# Patient Record
Sex: FEMALE | Race: BLACK OR AFRICAN AMERICAN | NOT HISPANIC OR LATINO | Employment: OTHER | URBAN - METROPOLITAN AREA
[De-identification: names, ages, dates, MRNs, and addresses within clinical notes are randomized per-mention and may not be internally consistent; named-entity substitution may affect disease eponyms.]

---

## 2018-05-24 ENCOUNTER — TELEPHONE (OUTPATIENT)
Dept: INTERNAL MEDICINE | Facility: CLINIC | Age: 68
End: 2018-05-24

## 2018-05-24 NOTE — TELEPHONE ENCOUNTER
Left message to informed pt that she is not taking any New patients at the moment and to call back to est care with another physician

## 2018-05-24 NOTE — TELEPHONE ENCOUNTER
----- Message from Dianne Cha sent at 5/24/2018  3:44 PM CDT -----  Contact: self/ [phone]  Patient has just moved here from [place] and would like to be a patient of Dr. Herman. Patient is being referred by long time patient Dianne Jasso and Kristan Jasso. Please consider.

## 2018-06-25 ENCOUNTER — OFFICE VISIT (OUTPATIENT)
Dept: INTERNAL MEDICINE | Facility: CLINIC | Age: 68
End: 2018-06-25
Payer: MEDICARE

## 2018-06-25 ENCOUNTER — LAB VISIT (OUTPATIENT)
Dept: LAB | Facility: HOSPITAL | Age: 68
End: 2018-06-25
Attending: INTERNAL MEDICINE
Payer: MEDICARE

## 2018-06-25 VITALS
BODY MASS INDEX: 28.11 KG/M2 | SYSTOLIC BLOOD PRESSURE: 125 MMHG | HEIGHT: 71 IN | WEIGHT: 200.81 LBS | DIASTOLIC BLOOD PRESSURE: 80 MMHG

## 2018-06-25 DIAGNOSIS — D25.1 INTRAMURAL LEIOMYOMA OF UTERUS: ICD-10-CM

## 2018-06-25 DIAGNOSIS — E78.2 MIXED HYPERLIPIDEMIA: ICD-10-CM

## 2018-06-25 DIAGNOSIS — N39.3 STRESS INCONTINENCE IN FEMALE: ICD-10-CM

## 2018-06-25 DIAGNOSIS — I10 ESSENTIAL HYPERTENSION: ICD-10-CM

## 2018-06-25 DIAGNOSIS — K57.30 DIVERTICULOSIS OF LARGE INTESTINE WITHOUT HEMORRHAGE: ICD-10-CM

## 2018-06-25 DIAGNOSIS — N18.30 CKD (CHRONIC KIDNEY DISEASE) STAGE 3, GFR 30-59 ML/MIN: ICD-10-CM

## 2018-06-25 DIAGNOSIS — E55.9 VITAMIN D DEFICIENCY DISEASE: ICD-10-CM

## 2018-06-25 DIAGNOSIS — Z11.59 ENCOUNTER FOR HEPATITIS C SCREENING TEST FOR LOW RISK PATIENT: ICD-10-CM

## 2018-06-25 DIAGNOSIS — M16.0 OSTEOARTHRITIS OF BOTH HIPS, UNSPECIFIED OSTEOARTHRITIS TYPE: ICD-10-CM

## 2018-06-25 DIAGNOSIS — I10 ESSENTIAL HYPERTENSION: Primary | ICD-10-CM

## 2018-06-25 DIAGNOSIS — J45.20 MILD INTERMITTENT ASTHMA WITHOUT COMPLICATION: ICD-10-CM

## 2018-06-25 DIAGNOSIS — M81.0 OSTEOPOROSIS WITHOUT CURRENT PATHOLOGICAL FRACTURE, UNSPECIFIED OSTEOPOROSIS TYPE: ICD-10-CM

## 2018-06-25 LAB
25(OH)D3+25(OH)D2 SERPL-MCNC: 16 NG/ML
ALBUMIN SERPL BCP-MCNC: 4 G/DL
ALP SERPL-CCNC: 90 U/L
ALT SERPL W/O P-5'-P-CCNC: 10 U/L
ANION GAP SERPL CALC-SCNC: 8 MMOL/L
AST SERPL-CCNC: 19 U/L
BACTERIA #/AREA URNS AUTO: ABNORMAL /HPF
BASOPHILS # BLD AUTO: 0.04 K/UL
BASOPHILS NFR BLD: 0.7 %
BILIRUB SERPL-MCNC: 0.7 MG/DL
BILIRUB UR QL STRIP: NEGATIVE
BUN SERPL-MCNC: 35 MG/DL
CALCIUM SERPL-MCNC: 10.4 MG/DL
CHLORIDE SERPL-SCNC: 108 MMOL/L
CHOLEST SERPL-MCNC: 161 MG/DL
CHOLEST/HDLC SERPL: 2.4 {RATIO}
CLARITY UR REFRACT.AUTO: ABNORMAL
CO2 SERPL-SCNC: 24 MMOL/L
COLOR UR AUTO: YELLOW
CREAT SERPL-MCNC: 1.6 MG/DL
DIFFERENTIAL METHOD: ABNORMAL
EOSINOPHIL # BLD AUTO: 0.5 K/UL
EOSINOPHIL NFR BLD: 7.7 %
ERYTHROCYTE [DISTWIDTH] IN BLOOD BY AUTOMATED COUNT: 13.8 %
EST. GFR  (AFRICAN AMERICAN): 38 ML/MIN/1.73 M^2
EST. GFR  (NON AFRICAN AMERICAN): 33 ML/MIN/1.73 M^2
GLUCOSE SERPL-MCNC: 93 MG/DL
GLUCOSE UR QL STRIP: NEGATIVE
HCT VFR BLD AUTO: 37.5 %
HDLC SERPL-MCNC: 68 MG/DL
HDLC SERPL: 42.2 %
HGB BLD-MCNC: 12 G/DL
HGB UR QL STRIP: NEGATIVE
HYALINE CASTS UR QL AUTO: 3 /LPF
KETONES UR QL STRIP: NEGATIVE
LDLC SERPL CALC-MCNC: 70.8 MG/DL
LEUKOCYTE ESTERASE UR QL STRIP: ABNORMAL
LYMPHOCYTES # BLD AUTO: 2.1 K/UL
LYMPHOCYTES NFR BLD: 35 %
MCH RBC QN AUTO: 30.5 PG
MCHC RBC AUTO-ENTMCNC: 32 G/DL
MCV RBC AUTO: 95 FL
MICROSCOPIC COMMENT: ABNORMAL
MONOCYTES # BLD AUTO: 0.7 K/UL
MONOCYTES NFR BLD: 11.1 %
NEUTROPHILS # BLD AUTO: 2.7 K/UL
NEUTROPHILS NFR BLD: 45.3 %
NITRITE UR QL STRIP: NEGATIVE
NONHDLC SERPL-MCNC: 93 MG/DL
PH UR STRIP: 5 [PH] (ref 5–8)
PLATELET # BLD AUTO: 253 K/UL
PMV BLD AUTO: 9.6 FL
POTASSIUM SERPL-SCNC: 3.4 MMOL/L
PROT SERPL-MCNC: 8.2 G/DL
PROT UR QL STRIP: NEGATIVE
RBC # BLD AUTO: 3.93 M/UL
RBC #/AREA URNS AUTO: 1 /HPF (ref 0–4)
SODIUM SERPL-SCNC: 140 MMOL/L
SP GR UR STRIP: 1.03 (ref 1–1.03)
SQUAMOUS #/AREA URNS AUTO: 2 /HPF
TRIGL SERPL-MCNC: 111 MG/DL
URN SPEC COLLECT METH UR: ABNORMAL
UROBILINOGEN UR STRIP-ACNC: NEGATIVE EU/DL
WBC # BLD AUTO: 5.85 K/UL
WBC #/AREA URNS AUTO: 3 /HPF (ref 0–5)

## 2018-06-25 PROCEDURE — 80053 COMPREHEN METABOLIC PANEL: CPT

## 2018-06-25 PROCEDURE — 85025 COMPLETE CBC W/AUTO DIFF WBC: CPT

## 2018-06-25 PROCEDURE — 99999 PR PBB SHADOW E&M-EST. PATIENT-LVL IV: CPT | Mod: PBBFAC,,, | Performed by: INTERNAL MEDICINE

## 2018-06-25 PROCEDURE — 81001 URINALYSIS AUTO W/SCOPE: CPT

## 2018-06-25 PROCEDURE — 99204 OFFICE O/P NEW MOD 45 MIN: CPT | Mod: S$PBB,,, | Performed by: INTERNAL MEDICINE

## 2018-06-25 PROCEDURE — 99214 OFFICE O/P EST MOD 30 MIN: CPT | Mod: PBBFAC | Performed by: INTERNAL MEDICINE

## 2018-06-25 PROCEDURE — 36415 COLL VENOUS BLD VENIPUNCTURE: CPT

## 2018-06-25 PROCEDURE — 87086 URINE CULTURE/COLONY COUNT: CPT

## 2018-06-25 PROCEDURE — 86803 HEPATITIS C AB TEST: CPT

## 2018-06-25 PROCEDURE — 82306 VITAMIN D 25 HYDROXY: CPT

## 2018-06-25 PROCEDURE — 80061 LIPID PANEL: CPT

## 2018-06-25 RX ORDER — VALSARTAN 320 MG/1
320 TABLET ORAL DAILY
Refills: 1 | COMMUNITY
Start: 2018-04-28 | End: 2018-06-25 | Stop reason: SDUPTHER

## 2018-06-25 RX ORDER — VALSARTAN 320 MG/1
320 TABLET ORAL DAILY
Qty: 30 TABLET | Refills: 12 | Status: SHIPPED | OUTPATIENT
Start: 2018-06-25 | End: 2019-07-17 | Stop reason: SDUPTHER

## 2018-06-25 RX ORDER — PRAVASTATIN SODIUM 20 MG/1
20 TABLET ORAL DAILY
Qty: 30 TABLET | Refills: 2 | Status: CANCELLED | OUTPATIENT
Start: 2018-06-25

## 2018-06-25 RX ORDER — ALENDRONATE SODIUM 70 MG/1
70 TABLET ORAL
Qty: 4 TABLET | Refills: 12 | Status: SHIPPED | OUTPATIENT
Start: 2018-06-25 | End: 2019-07-27 | Stop reason: SDUPTHER

## 2018-06-25 RX ORDER — CALCITRIOL 0.25 UG/1
0.25 CAPSULE ORAL EVERY OTHER DAY
Refills: 2 | COMMUNITY
Start: 2018-05-29 | End: 2018-06-26

## 2018-06-25 RX ORDER — ALENDRONATE SODIUM 70 MG/1
70 TABLET ORAL
Refills: 1 | COMMUNITY
Start: 2018-04-03 | End: 2018-06-25 | Stop reason: SDUPTHER

## 2018-06-25 RX ORDER — ALBUTEROL SULFATE 90 UG/1
2 AEROSOL, METERED RESPIRATORY (INHALATION) EVERY 8 HOURS
Qty: 1 INHALER | Refills: 12
Start: 2018-06-25 | End: 2020-03-13

## 2018-06-25 RX ORDER — AMLODIPINE BESYLATE 5 MG/1
5 TABLET ORAL 2 TIMES DAILY
Qty: 30 TABLET | Refills: 12 | Status: SHIPPED | OUTPATIENT
Start: 2018-06-25 | End: 2019-01-16

## 2018-06-25 RX ORDER — PRAVASTATIN SODIUM 20 MG/1
20 TABLET ORAL DAILY
Refills: 4 | COMMUNITY
Start: 2018-06-04 | End: 2018-10-27 | Stop reason: SDUPTHER

## 2018-06-25 RX ORDER — AMLODIPINE BESYLATE 5 MG/1
5 TABLET ORAL 2 TIMES DAILY
Refills: 1 | COMMUNITY
Start: 2018-04-28 | End: 2018-06-25 | Stop reason: SDUPTHER

## 2018-06-25 NOTE — PATIENT INSTRUCTIONS
Understanding Diverticulosis and Diverticulitis     Pouches or diverticula usually occur in the lower part of the colon called the sigmoid.     The colon (large intestine) is the last part of the digestive tract. It absorbs water from stool and changes it from a liquid to a solid. In certain cases, small pouches called diverticula can form in the colon wall. This condition is called diverticulosis. The pouches can become infected. If this happens, it becomes a more serious problem called diverticulitis. These problems can be painful. But they can be managed.  Managing your condition  Diet changes or medicines may be prescribed.   If you have diverticulosis  Recommendations include:  · Diet changes are often enough to control symptoms. The main changes are adding fiber (roughage) and drinking more water. Fiber absorbs water as it travels through your colon. This helps your stool stay soft and move smoothly. Water helps this process.  · If needed, you may be told to take over-the-counter stool softeners.  · To help relieve pain, antispasmodic medicines may be prescribed.  · Watch for changes in your bowel movements. Tell the healthcare provider if you notice any changes.  · Begin an exercise program. Ask your healthcare provider how to get started.  · Get plenty of rest and sleep.   If you have diverticulitis  Treatment depends on how bad your symptoms are.  · For mild symptoms. You may be put on a liquid diet for a short time. Antibiotics are usually prescribed. If these two steps relieve your symptoms, you may then be prescribed a high-fiber diet. If you still have symptoms, your healthcare provider will discuss more treatment choices with you.  · For severe symptoms. You may need to be admitted to the hospital. There, you can be given IV antibiotics and fluids. You will also be put on a low-fiber or liquid diet. Although not common, surgery is needed in some people with severe symptoms.  [place] to colon health      Diverticulitis occurs when the pouches become infected or inflamed.     Help keep your colon healthy with a diet that includes plenty of high-fiber fruits, vegetables, and whole grains. Drink plenty of liquids like water and juice. Maintain a healthy lifestyle including regular exercise, stress management, and adequate rest and sleep.   Date Last Reviewed: 7/1/2016  © 8772-9260 [employer]. [address] [place] [address], [place] [address]. All rights reserved. This information is not intended as a substitute for professional medical care. Always follow your healthcare professional's instructions.        Prevention Guidelines, Women Ages 65 and Older  Screening tests and vaccines are an important part of managing your health. Health counseling is essential, too. Below are guidelines for these, for women ages 65 and older. Talk with your healthcare provider to make sure youre up to date on what you need.  Screening Who needs it How often   Type 2 diabetes or prediabetes All adults beginning at age 45 and adults without symptoms at any age who are overweight or obese and have 1 or more additional risk factors for diabetes At least every 3 years   Alcohol misuse All women in this age group At routine exams   Blood pressure All women in this age group Every 2 years if your blood pressure is less than 120/80 mm Hg; yearly if your systolic blood pressure is 120 to 139 mm Hg, or your diastolic blood pressure reading is 80 to 89 mm Hg   Breast cancer All women in this age group Yearly mammogram and clinical breast exam1   Cervical cancer Only women who had abnormal screening results before age 65 Talk with your healthcare provider   Chlamydia Women at increased risk for infection At routine exams   Colorectal cancer All women in this age group1 Flexible sigmoidoscopy every 5 years, or colonoscopy every 10 years, or double-contrast barium enema every 5 years; yearly fecal occult blood test or fecal immunochemical  test; or a stool DNA test as often as your healthcare provider advises; talk with your healthcare provider about which tests are best for you   Depression All women in this age group At routine exams   Gonorrhea Sexually active women at increased risk for infection At routine exams   Hepatitis C Anyone at increased risk; 1 time for those born between 1945 and 1965 At routine exams   High cholesterol or triglycerides All women in this age group who are at risk for coronary artery disease At least every 5 years   HIV Women at increased risk for infection - talk with your healthcare provider At routine exams   Lung cancer Adults age 55 to 80 who have smoked Yearly screening in smokers with 30 pack-year history of smoking or who quit within 15 years   Obesity All women in this age group At routine exams   Osteoporosis All women in this age group Bone density test at age 65, then follow-up as advised by your healthcare provider   Syphilis Women at increased risk for infection - talk with your healthcare provider At routine exams   Thyroid-Stimulating Hormone (TSH) All women in this age group Every 5 years   Tuberculosis Women at increased risk for infection - talk with your healthcare provider Ask your healthcare provider   Vision All women in this age group Every 1 to 2 years; if you have a chronic health condition, ask your healthcare provider if you need exams more often   Vaccine Who needs it How often   Chickenpox (varicella) All women in this age group who have no record of this infection or vaccine 2 doses; second dose should be given at least 4 weeks after the first dose   Hepatitis A Women at increased risk for infection - talk with your healthcare provider 2 doses given 6 months apart   Hepatitis B Women at increased risk for infection - talk with your healthcare provider 3 doses over 6 months; second dose should be given 1 month after the first dose; the third dose should be given at least 2 months after the  second dose and at least 4 months after the first dose   Haemophilus influenza Type B (HIB) Women at increased risk for infection - talk with your healthcare provider 1 to 3 doses   Influenza (flu) All women in this age group Once a year   Pneumococcal conjugate vaccine (PCV13) and pneumococcal polysaccharide vaccine (PPSV23) All women in this age group 1 dose of each vaccine   Tetanus/diphtheria/pertussis (Td/Tdap) booster All women in this age group Td every 10 years, or a one-time dose of Tdap instead of a Td booster after age 18, then Td every 10 years   Zoster All women in this age group 1 dose   Counseling Who needs it How often   Diet and exercise Women who are overweight or obese When diagnosed, and then at routine exams   Fall prevention (exercise and vitamin D supplements) All women in this age group At routine exams   Sexually transmitted infection prevention Women at increased risk for infection - talk with your healthcare provider At routine exams   Use of daily aspirin Women ages 55 and up in this age group who are at risk for cardiovascular health problems such as stroke When your risk is known   Use of tobacco and the health effects it can cause All women in this age group Every exam   1American [employer] Society  Date Last Reviewed: 8/9/2015  © 1224-4495 [employer]. [address] [place] [address], [place] [address]. All rights reserved. This information is not intended as a substitute for professional medical care. Always follow your healthcare professional's instructions.

## 2018-06-25 NOTE — PROGRESS NOTES
Subjective:       Patient ID: Moriah Cha is a 68 y.o. female.    Chief Complaint: Hypertension; Hyperlipidemia; and Osteoporosis    Multiple issues    HTN, lipids, osteoporosis    Sees Dr Derek Buck for GYN- he will take care of her mammograms.  Last mammogram was done in [place] in November of 2017.    Skin tags    Orthopedic issues, hip pain, walks with a cane.  DJD both hips seen on CT scans in [place].  Hurts to sit and walk, at least 6 months.  Quite severe, quite limited.    Diverticulosis without diverticulitis on CT 4/17.    No symptoms, she was not aware.    Calcified uterine fibroid on CT 4/17.    Hypertension and hyperlipidemia times many years.  CKD.  Has seen Nephrology in the past.  Trivial smoking history < 12 pack years.      Patient Active Problem List:     Essential hypertension     Osteoporosis without current pathological fracture     Mixed hyperlipidemia     Osteoarthritis of both hips     CKD (chronic kidney disease) stage 3, GFR 30-59 ml/min     Diverticulosis of large intestine without hemorrhage: seen on CT scan April 2017     Intramural leiomyoma of uterus: see CT scan April 2017     Vitamin D deficiency disease     Mild intermittent asthma without complication     Stress incontinence in female            Review of Systems   Constitutional: Negative for activity change, appetite change, chills, fatigue and fever.   HENT: Negative for congestion, hearing loss, sinus pressure and sore throat.    Eyes: Negative for visual disturbance.   Respiratory: Negative for apnea, cough, shortness of breath and wheezing.    Cardiovascular: Negative for chest pain, palpitations and leg swelling.   Gastrointestinal: Negative for abdominal distention, abdominal pain, constipation, diarrhea, nausea and vomiting.   Genitourinary: Negative for dysuria, frequency, hematuria and vaginal bleeding.        TOMAS   Musculoskeletal: Positive for arthralgias. Negative for gait problem, joint swelling and myalgias.         Walks with cane   Skin: Negative for rash.        Skin tag L arm  Cyst R leg   Neurological: Negative for dizziness, weakness, light-headedness and headaches.   Hematological: Negative for adenopathy. Does not bruise/bleed easily.   Psychiatric/Behavioral: Negative for confusion, hallucinations, sleep disturbance and suicidal ideas.       Objective:      Physical Exam   Constitutional: She is oriented to person, place, and time. She appears well-developed and well-nourished.   HENT:   Head: Normocephalic and atraumatic.   Right Ear: External ear normal.   Left Ear: External ear normal.   Nose: Nose normal.   Mouth/Throat: Oropharynx is clear and moist. No oropharyngeal exudate.   Eyes: Conjunctivae and EOM are normal. No scleral icterus.   Neck: Normal range of motion. Neck supple. No JVD present. No thyromegaly present.   Cardiovascular: Normal rate, regular rhythm, normal heart sounds and intact distal pulses.  Exam reveals no gallop.    No murmur heard.  Pulmonary/Chest: Effort normal and breath sounds normal. No respiratory distress. She has no wheezes.   Abdominal: Soft. Bowel sounds are normal. She exhibits no distension and no mass. There is no tenderness. There is no rebound and no guarding.   Musculoskeletal: Normal range of motion. She exhibits no edema or tenderness.   Walks with cane otherwise unassisted   Lymphadenopathy:     She has no cervical adenopathy.   Neurological: She is alert and oriented to person, place, and time. She displays normal reflexes. No cranial nerve deficit. Coordination normal.   Skin: Skin is warm. No rash noted. No erythema.   Skin tag L arm  Cyst subcutaneous R thigh   Psychiatric: She has a normal mood and affect. Her behavior is normal. Judgment and thought content normal.   Nursing note and vitals reviewed.      Assessment:       1. Essential hypertension    2. Osteoporosis without current pathological fracture, unspecified osteoporosis type    3. Mixed hyperlipidemia     4. Osteoarthritis of both hips, unspecified osteoarthritis type    5. CKD (chronic kidney disease) stage 3, GFR 30-59 ml/min    6. Diverticulosis of large intestine without hemorrhage: seen on CT scan April 2017    7. Intramural leiomyoma of uterus: see CT scan April 2017    8. Vitamin D deficiency disease    9. Encounter for hepatitis C screening test for low risk patient    10. Mild intermittent asthma without complication    11. Stress incontinence in female        Plan:         Essential hypertension  -     Comprehensive metabolic panel; Future; Expected date: 06/25/2018  -     CBC auto differential; Future; Expected date: 06/25/2018    Osteoporosis without current pathological fracture, unspecified osteoporosis type:  She thinks she has had a bone density in the last year and would like to defer on a DEXA scan until she can track down that report.  She will continue her current regimen until such time as that can be completed.    Mixed hyperlipidemia  -     Lipid panel; Future; Expected date: 06/25/2018    Osteoarthritis of both hips, unspecified osteoarthritis type  -     Ambulatory referral to Orthopedics    CKD (chronic kidney disease) stage 3, GFR 30-59 ml/min:  Gentle hydration, avoid nephrotoxins.  Labs and review    Diverticulosis of large intestine without hemorrhage: seen on CT scan April 2017:  No symptoms.  Handouts given.    Intramural leiomyoma of uterus: see CT scan April 2017:  No symptoms, keep gyn follow-up    Vitamin D deficiency disease  -     Vitamin D; Future; Expected date: 06/25/2018    Encounter for hepatitis C screening test for low risk patient  -     Hepatitis C antibody; Future; Expected date: 06/25/2018    Mild intermittent asthma without complication:  Uses albuterol very infrequently    Stress incontinence in female  -     Urinalysis  -     Urine culture    Other orders  -     alendronate (FOSAMAX) 70 MG tablet; Take 1 tablet (70 mg total) by mouth every 7 days.  Dispense: 4  tablet; Refill: 12  -     amLODIPine (NORVASC) 5 MG tablet; Take 1 tablet (5 mg total) by mouth 2 (two) times daily.  Dispense: 30 tablet; Refill: 12  -     valsartan (DIOVAN) 320 MG tablet; Take 1 tablet (320 mg total) by mouth once daily.  Dispense: 30 tablet; Refill: 12  -     albuterol 90 mcg/actuation inhaler; Inhale 2 puffs into the lungs every 8 (eight) hours.  Dispense: 1 Inhaler; Refill: 12    Exercise as tolerated, she is very limited, Orthopedics recommended  Tetanus booster recommended, she is going to research whether she may have had this done  Shingles vaccine recommended, not available currently    I will review all studies and determine further tx depending on findings

## 2018-06-26 ENCOUNTER — TELEPHONE (OUTPATIENT)
Dept: INTERNAL MEDICINE | Facility: CLINIC | Age: 68
End: 2018-06-26

## 2018-06-26 DIAGNOSIS — N18.30 CKD (CHRONIC KIDNEY DISEASE) STAGE 3, GFR 30-59 ML/MIN: ICD-10-CM

## 2018-06-26 DIAGNOSIS — E55.9 VITAMIN D DEFICIENCY DISEASE: Primary | ICD-10-CM

## 2018-06-26 LAB
BACTERIA UR CULT: NORMAL
BACTERIA UR CULT: NORMAL
HCV AB SERPL QL IA: NEGATIVE

## 2018-06-26 RX ORDER — ERGOCALCIFEROL 1.25 MG/1
50000 CAPSULE ORAL
Qty: 12 CAPSULE | Refills: 3 | Status: SHIPPED | OUTPATIENT
Start: 2018-06-26 | End: 2019-01-16

## 2018-06-26 RX ORDER — VIT C/E/ZN/COPPR/LUTEIN/ZEAXAN 250MG-90MG
2000 CAPSULE ORAL DAILY
Qty: 60 CAPSULE | Refills: 12 | Status: SHIPPED | OUTPATIENT
Start: 2018-06-26 | End: 2019-01-16

## 2018-06-26 NOTE — TELEPHONE ENCOUNTER
----- Message from Charmaine Zavaleta sent at 6/26/2018  3:27 PM CDT -----  Contact: Santo/JADE/[phone]  Pharmacy is calling to clarify an RX.  RX name:cholecalciferol, vitamin D3, 1,000 unit capsule    What do they need to clarify:  If patient is suppose to be on 3 different vitamin d  Comments: thank you!!!

## 2018-06-26 NOTE — TELEPHONE ENCOUNTER
Patient has low vitamin D.  I sent Vitamin D 61196 IU 1 x weekly for 20 weeks.  Also should also take vitamin D 2000 IU daily, recheck in 4 months (orders in) thanks    Other labs are stable.  Given her chronic kidney disease, I would recommend an appointment with Nephrology, although it is not urgent.  A referral is in place, please assist with appointment.  Thank you

## 2018-06-27 NOTE — TELEPHONE ENCOUNTER
I recommend she stop the calcitriol because I do not think it is effective.    I sent Vitamin D 81970 IU 1 x weekly for 20 weeks.  Also should also take vitamin D 2000 IU daily, recheck in 4 months (orders in) thanks     Other labs are stable.  Given her chronic kidney disease, I would recommend an appointment with Nephrology, although it is not urgent.  A referral is in place, please assist with appointment.  Thank you

## 2018-06-27 NOTE — TELEPHONE ENCOUNTER
Spoke with [facility]ist as [facility], pt is currently on vit D 50,000 units and additional 2000 units prescribed by dr Herman. Per pharmacist, pt has medication on file Calcitrol 0.25 mcg one capsule every other day prescribed by another MD in May of this year.. Pharmacist is concerned that she's on too much vit D medication

## 2018-06-27 NOTE — TELEPHONE ENCOUNTER
"Spoke with pt, notified of the medication change, pt verbalized understanding. Lab appt made. Pt doesn't know her ride schedule, she would like to schedule Nephrology appt herself, phone number is provided. Pt is wondering, since she has stopped Oxybutynin ( it didn't help her all), what else can she take in its place? Also, pt is having hip pain, besides Tylenol, is there any "rub" or spray that she can use to for pain?  "

## 2018-06-28 NOTE — TELEPHONE ENCOUNTER
Given her kidney issues, I would recommend she discuss her urinary issues with the nephrologist before we start any new medication.  Similarly, for her hip, she needs to see Orthopedics.  Urine acceptable

## 2018-06-29 ENCOUNTER — TELEPHONE (OUTPATIENT)
Dept: INTERNAL MEDICINE | Facility: CLINIC | Age: 68
End: 2018-06-29

## 2018-06-29 NOTE — TELEPHONE ENCOUNTER
----- Message from Charmainedarrin Zavaleta sent at 6/29/2018  3:28 PM CDT -----  Contact: self/[phone]  Patient called in regards needing to talk with Dr Herman about find out if there is a specific nephrologist and orthopaedic that patient can be refer to. Also would like to know about any medicine she can be offer for frequent urination, and if there is a topica for hip pain.    Please call and advise. Thank you!!!

## 2018-06-29 NOTE — TELEPHONE ENCOUNTER
No specific ortho or nephrologist    Given her kidney issues, I would recommend she discuss her urinary issues with the nephrologist before we start any new medication.      In terms of the hip, it is so severe that I am not optimistic that any topical treatment will be beneficial.    Urine results are acceptable.    Referrals are in for both Orthopedics and Nephrology.

## 2018-07-17 ENCOUNTER — TELEPHONE (OUTPATIENT)
Dept: INTERNAL MEDICINE | Facility: CLINIC | Age: 68
End: 2018-07-17

## 2018-07-17 NOTE — TELEPHONE ENCOUNTER
----- Message from Mita Jose sent at 7/16/2018  4:36 PM CDT -----  Contact: [phone]  Patient has questions for the doctor:  Can the code for patient my [facility] portal be reactivated if patient doesn't use it by the end of the year?    On the list of vaccines for patient which ones are neccessary for her to take?    Should she no longer take pravastatin (PRAVACHOL) 20 MG tablet,  vit B complx C/folic acid/zinc (DIALYVITE 800 WITH ZINC 15 ORAL), and calcitriol?    Did Dr. Dominguez send over medical records?    Please call and advise.  Thank you

## 2018-07-30 ENCOUNTER — TELEPHONE (OUTPATIENT)
Dept: INTERNAL MEDICINE | Facility: CLINIC | Age: 68
End: 2018-07-30

## 2018-07-30 NOTE — TELEPHONE ENCOUNTER
----- Message from Kendal Mo sent at 7/30/2018  2:24 PM CDT -----  Contact: Patient [phone]  She want you to refax her records request form to [facility] and the fax number [phone].  Please call and advise her when it it done. The 's name is Tien Dominguez.    Thank you

## 2018-09-21 RX ORDER — CALCITRIOL 0.25 UG/1
0.25 CAPSULE ORAL DAILY
OUTPATIENT
Start: 2018-09-21

## 2018-09-21 NOTE — TELEPHONE ENCOUNTER
----- Message from Aniya Herring sent at 9/21/2018  2:46 PM CDT -----  Contact: [facility] [phone]  Prescription Request:     Name of medication: calcitRIOL (ROCALTROL) 0.25 MCG Cap     Reason for request: Refill    Pharmacy: [facility]/pharmacy #[id number] - [address]    Please advise.    Thank You

## 2018-09-24 ENCOUNTER — TELEPHONE (OUTPATIENT)
Dept: INTERNAL MEDICINE | Facility: CLINIC | Age: 68
End: 2018-09-24

## 2018-09-24 NOTE — TELEPHONE ENCOUNTER
----- Message from Aniya Herring sent at 9/24/2018 12:46 PM CDT -----  Contact: [facility] [phone]  Prescription Request:     Name of medication: calcitRIOL (ROCALTROL) 0.25 MCG Cap     Reason for request: Refill    Pharmacy: [facility]/pharmacy #[id number] - [address]    Please advise.    Thank You

## 2018-10-17 ENCOUNTER — TELEPHONE (OUTPATIENT)
Dept: INTERNAL MEDICINE | Facility: CLINIC | Age: 68
End: 2018-10-17

## 2018-10-17 NOTE — TELEPHONE ENCOUNTER
Spoke to pt and went over her labs coming up and also pt was notified that she can have her shots at the pharmacy   Pt voiced understanding

## 2018-10-17 NOTE — TELEPHONE ENCOUNTER
----- Message from Willow Cha sent at 10/17/2018 12:40 PM CDT -----  Contact: Patient [phone]  Pt wants to know if she would be able to get her tetanus shot with  and she wants to know if Dr. Herman will schedule an appt after her labs. Please call back and advise.      Thanks

## 2018-10-22 ENCOUNTER — IMMUNIZATION (OUTPATIENT)
Dept: INTERNAL MEDICINE | Facility: CLINIC | Age: 68
End: 2018-10-22
Payer: MEDICARE

## 2018-10-22 ENCOUNTER — TELEPHONE (OUTPATIENT)
Dept: INTERNAL MEDICINE | Facility: CLINIC | Age: 68
End: 2018-10-22

## 2018-10-22 ENCOUNTER — LAB VISIT (OUTPATIENT)
Dept: LAB | Facility: HOSPITAL | Age: 68
End: 2018-10-22
Attending: INTERNAL MEDICINE
Payer: MEDICARE

## 2018-10-22 DIAGNOSIS — N18.30 CKD (CHRONIC KIDNEY DISEASE) STAGE 3, GFR 30-59 ML/MIN: ICD-10-CM

## 2018-10-22 DIAGNOSIS — E55.9 VITAMIN D DEFICIENCY DISEASE: ICD-10-CM

## 2018-10-22 LAB
25(OH)D3+25(OH)D2 SERPL-MCNC: 33 NG/ML
ALBUMIN SERPL BCP-MCNC: 4 G/DL
ANION GAP SERPL CALC-SCNC: 11 MMOL/L
BUN SERPL-MCNC: 30 MG/DL
CALCIUM SERPL-MCNC: 10.6 MG/DL
CHLORIDE SERPL-SCNC: 103 MMOL/L
CO2 SERPL-SCNC: 24 MMOL/L
CREAT SERPL-MCNC: 1.5 MG/DL
EST. GFR  (AFRICAN AMERICAN): 41 ML/MIN/1.73 M^2
EST. GFR  (NON AFRICAN AMERICAN): 35.5 ML/MIN/1.73 M^2
GLUCOSE SERPL-MCNC: 90 MG/DL
PHOSPHATE SERPL-MCNC: 3.5 MG/DL
POTASSIUM SERPL-SCNC: 3.9 MMOL/L
SODIUM SERPL-SCNC: 138 MMOL/L

## 2018-10-22 PROCEDURE — 80069 RENAL FUNCTION PANEL: CPT

## 2018-10-22 PROCEDURE — 82306 VITAMIN D 25 HYDROXY: CPT

## 2018-10-22 PROCEDURE — 36415 COLL VENOUS BLD VENIPUNCTURE: CPT

## 2018-10-22 PROCEDURE — 90662 IIV NO PRSV INCREASED AG IM: CPT | Mod: PBBFAC

## 2018-10-22 NOTE — TELEPHONE ENCOUNTER
----- Message from Leann Sahni sent at 10/22/2018 11:03 AM CDT -----  Contact: pt  Pt would like to be called back regarding the flu shot also Pneumonia     Pt can be reached at [phone]

## 2018-10-25 ENCOUNTER — TELEPHONE (OUTPATIENT)
Dept: INTERNAL MEDICINE | Facility: CLINIC | Age: 68
End: 2018-10-25

## 2018-10-25 DIAGNOSIS — M81.0 OSTEOPOROSIS WITHOUT CURRENT PATHOLOGICAL FRACTURE, UNSPECIFIED OSTEOPOROSIS TYPE: ICD-10-CM

## 2018-10-25 DIAGNOSIS — N18.30 CKD (CHRONIC KIDNEY DISEASE) STAGE 3, GFR 30-59 ML/MIN: Primary | ICD-10-CM

## 2018-10-25 NOTE — TELEPHONE ENCOUNTER
Please let her know that her labs show elevated calcium that is slight, likely related to her kidney function.  I recommend that she not take any calcium over-the-counter currently.    I also recommend that she get established with a nephrologist.  Please assist with appointment, referral is in.  Please let me know when scheduled.    She is also due for a bone density exam which I have ordered.  Repeat lab work in 3 months and see me at that time, orders in.    Thank you

## 2018-10-28 RX ORDER — PRAVASTATIN SODIUM 20 MG/1
TABLET ORAL
Qty: 30 TABLET | Refills: 1 | Status: SHIPPED | OUTPATIENT
Start: 2018-10-28 | End: 2019-01-01 | Stop reason: SDUPTHER

## 2018-10-30 NOTE — TELEPHONE ENCOUNTER
----- Message from Willow Semaj sent at 10/30/2018 11:59 AM CDT -----  Contact: Patient [phone]  Type: Returning a call    Who left a message?Liliana Peraza LPN    When did the practice call?Friday 10/26/18    Comments:Please call back.      Thanks

## 2018-11-09 NOTE — TELEPHONE ENCOUNTER
Has been very difficult to reach, has not responded to numerous phone calls.  She is not on my [facility].  Letter sent today.

## 2018-11-16 ENCOUNTER — TELEPHONE (OUTPATIENT)
Dept: INTERNAL MEDICINE | Facility: CLINIC | Age: 68
End: 2018-11-16

## 2018-11-16 NOTE — TELEPHONE ENCOUNTER
----- Message from Kendal Mo sent at 11/16/2018  2:39 PM CST -----  Contact: Patient [phone]  The patient requested a call back to discuss the Nephrology referral.    Thank you

## 2018-11-16 NOTE — TELEPHONE ENCOUNTER
Spoke with pt and she wants to let you know nephrology dont have any appts until January 2019 . What should she do until then. Pt also wants to know which nephrology md you recommend. Pt aware you out the office until monday

## 2018-11-20 ENCOUNTER — HOSPITAL ENCOUNTER (OUTPATIENT)
Dept: RADIOLOGY | Facility: OTHER | Age: 68
Discharge: HOME | End: 2018-11-20
Attending: INTERNAL MEDICINE
Payer: MEDICARE

## 2018-11-20 ENCOUNTER — TELEPHONE (OUTPATIENT)
Dept: INTERNAL MEDICINE | Facility: CLINIC | Age: 68
End: 2018-11-20

## 2018-11-20 DIAGNOSIS — M81.0 OSTEOPOROSIS WITHOUT CURRENT PATHOLOGICAL FRACTURE, UNSPECIFIED OSTEOPOROSIS TYPE: ICD-10-CM

## 2018-11-20 PROCEDURE — 77080 DXA BONE DENSITY AXIAL: CPT | Mod: 26,,, | Performed by: RADIOLOGY

## 2018-11-20 PROCEDURE — 77080 DXA BONE DENSITY AXIAL: CPT | Mod: TC

## 2018-11-20 NOTE — TELEPHONE ENCOUNTER
Please call to let her know that her bone density does show osteoporosis. Please ask if she is taking her Fosamax as directed?  It is once weekly.

## 2018-11-20 NOTE — TELEPHONE ENCOUNTER
Spoke with pt, notified. Pt verbalized understanding. Pt is taking Fosamax weekly as prescribed for almost a year now.

## 2018-11-21 ENCOUNTER — TELEPHONE (OUTPATIENT)
Dept: INTERNAL MEDICINE | Facility: CLINIC | Age: 68
End: 2018-11-21

## 2018-11-21 NOTE — TELEPHONE ENCOUNTER
Okay, please encourage her to continue taking the medication.  We may need to do another bone density in 1 year instead of 2.  Keep appointment to see me in January.  Thank you

## 2018-11-21 NOTE — TELEPHONE ENCOUNTER
----- Message from Kendal Mo sent at 11/21/2018  4:15 PM CST -----  Contact: Patient [phone]  Patient is returning a phone call.  Who left a message for the patient: Liliana  Does patient know what this is regarding:  Yes. I gave her the message from Dr. Herman in the notes. She said ok

## 2018-12-18 ENCOUNTER — TELEPHONE (OUTPATIENT)
Dept: INTERNAL MEDICINE | Facility: CLINIC | Age: 68
End: 2018-12-18

## 2018-12-18 NOTE — TELEPHONE ENCOUNTER
----- Message from Suzanne Horner sent at 12/18/2018  4:04 PM CST -----  Contact: [phone]  Patient is requesting office to mail her last blood test to her, patient would like a copy.    Please advise,thanks

## 2019-01-02 RX ORDER — PRAVASTATIN SODIUM 20 MG/1
TABLET ORAL
Qty: 30 TABLET | Refills: 1 | Status: SHIPPED | OUTPATIENT
Start: 2019-01-02 | End: 2019-03-09 | Stop reason: SDUPTHER

## 2019-01-09 ENCOUNTER — LAB VISIT (OUTPATIENT)
Dept: LAB | Facility: OTHER | Age: 69
End: 2019-01-09
Payer: MEDICARE

## 2019-01-09 DIAGNOSIS — N18.30 CKD (CHRONIC KIDNEY DISEASE) STAGE 3, GFR 30-59 ML/MIN: ICD-10-CM

## 2019-01-09 LAB
ALBUMIN SERPL BCP-MCNC: 4.1 G/DL
ANION GAP SERPL CALC-SCNC: 11 MMOL/L
BUN SERPL-MCNC: 28 MG/DL
CALCIUM SERPL-MCNC: 10.2 MG/DL
CHLORIDE SERPL-SCNC: 104 MMOL/L
CO2 SERPL-SCNC: 23 MMOL/L
CREAT SERPL-MCNC: 1.4 MG/DL
EST. GFR  (AFRICAN AMERICAN): 44 ML/MIN/1.73 M^2
EST. GFR  (NON AFRICAN AMERICAN): 38 ML/MIN/1.73 M^2
GLUCOSE SERPL-MCNC: 80 MG/DL
PHOSPHATE SERPL-MCNC: 3.3 MG/DL
POTASSIUM SERPL-SCNC: 3.6 MMOL/L
SODIUM SERPL-SCNC: 138 MMOL/L

## 2019-01-09 PROCEDURE — 80069 RENAL FUNCTION PANEL: CPT

## 2019-01-16 ENCOUNTER — TELEPHONE (OUTPATIENT)
Dept: INTERNAL MEDICINE | Facility: CLINIC | Age: 69
End: 2019-01-16

## 2019-01-16 ENCOUNTER — CLINICAL SUPPORT (OUTPATIENT)
Dept: INTERNAL MEDICINE | Facility: CLINIC | Age: 69
End: 2019-01-16
Payer: MEDICARE

## 2019-01-16 ENCOUNTER — OFFICE VISIT (OUTPATIENT)
Dept: INTERNAL MEDICINE | Facility: CLINIC | Age: 69
End: 2019-01-16
Payer: MEDICARE

## 2019-01-16 VITALS — DIASTOLIC BLOOD PRESSURE: 75 MMHG | SYSTOLIC BLOOD PRESSURE: 120 MMHG

## 2019-01-16 DIAGNOSIS — E78.2 MIXED HYPERLIPIDEMIA: ICD-10-CM

## 2019-01-16 DIAGNOSIS — N18.30 CKD (CHRONIC KIDNEY DISEASE) STAGE 3, GFR 30-59 ML/MIN: ICD-10-CM

## 2019-01-16 DIAGNOSIS — N25.81 SECONDARY HYPERPARATHYROIDISM OF RENAL ORIGIN: ICD-10-CM

## 2019-01-16 DIAGNOSIS — M81.8 OTHER OSTEOPOROSIS WITHOUT CURRENT PATHOLOGICAL FRACTURE: ICD-10-CM

## 2019-01-16 DIAGNOSIS — I10 ESSENTIAL HYPERTENSION: Primary | ICD-10-CM

## 2019-01-16 DIAGNOSIS — D21.9 FIBROMA: ICD-10-CM

## 2019-01-16 DIAGNOSIS — E55.9 VITAMIN D DEFICIENCY DISEASE: ICD-10-CM

## 2019-01-16 DIAGNOSIS — Z23 IMMUNIZATION, PNEUMOCOCCUS AND INFLUENZA: Primary | ICD-10-CM

## 2019-01-16 PROCEDURE — 90471 IMMUNIZATION ADMIN: CPT | Mod: PBBFAC

## 2019-01-16 PROCEDURE — 99214 OFFICE O/P EST MOD 30 MIN: CPT | Mod: S$PBB,,, | Performed by: INTERNAL MEDICINE

## 2019-01-16 PROCEDURE — 99999 PR PBB SHADOW E&M-EST. PATIENT-LVL III: CPT | Mod: PBBFAC,,, | Performed by: INTERNAL MEDICINE

## 2019-01-16 PROCEDURE — 90715 TDAP VACCINE 7 YRS/> IM: CPT | Mod: PBBFAC

## 2019-01-16 PROCEDURE — 90472 IMMUNIZATION ADMIN EACH ADD: CPT | Mod: PBBFAC

## 2019-01-16 PROCEDURE — 99214 PR OFFICE/OUTPT VISIT, EST, LEVL IV, 30-39 MIN: ICD-10-PCS | Mod: S$PBB,,, | Performed by: INTERNAL MEDICINE

## 2019-01-16 PROCEDURE — 99213 OFFICE O/P EST LOW 20 MIN: CPT | Mod: PBBFAC | Performed by: INTERNAL MEDICINE

## 2019-01-16 PROCEDURE — 99999 PR PBB SHADOW E&M-EST. PATIENT-LVL III: ICD-10-PCS | Mod: PBBFAC,,, | Performed by: INTERNAL MEDICINE

## 2019-01-16 RX ORDER — AMLODIPINE BESYLATE 10 MG/1
10 TABLET ORAL DAILY
Refills: 6 | COMMUNITY
Start: 2018-12-27 | End: 2019-10-30 | Stop reason: SDUPTHER

## 2019-01-16 RX ORDER — ERGOCALCIFEROL 1.25 MG/1
50000 CAPSULE ORAL
Qty: 3 CAPSULE | Refills: 3
Start: 2019-01-16 | End: 2019-10-30

## 2019-01-16 NOTE — PATIENT INSTRUCTIONS
What Is Stress Urinary Incontinence?  Stress urinary incontinence is a common type of bladder control problem in women (although it may also occur in men). It refers to leakage of urine during events that result in increased abdominal pressure, such as sneezing, coughing, physical exercise, lifting, bending, and even changing positions. Stress incontinence may occur when the structures that help hold urine in your bladder become weak.  What are the symptoms of stress incontinence?  If you have stress incontinence, you may leak urine when you:  · Cough, sneeze, or laugh  · Lift something heavy  · Exercise     Strong pelvic floor muscles and connective tissue, and a strong urethral sphincter, help keep urine in the bladder.       Weak or torn pelvic floor muscles and connective tissue, or a weak urethral sphincter, can let urine leak out of the bladder.      Normal urine control  The bladder holds urine until you are ready to let it flow out. These structures help:  · The pelvic floor muscles and connective tissue help hold the pelvic organs in place. When the muscles and connective tissue are strong, the urethra and bladder are well supported. This helps keep the urethra closed, so urine doesnt leak.  · The urethral sphincter is a band of muscles around the urethra. When these muscles are strong, they keep urine in the bladder. These muscles relax when you want urine to flow out.  If urine leaks out  The pelvic floor muscles and connective tissue may stretch, weaken, or tear. Weak or torn structures cant support the urethra and bladder. The urethral sphincter may also weaken. These changes can cause urine to leak. The changes may be caused by:  · Pregnancy and vaginal childbirth or  (surgery to deliver a baby)  · Constant coughing (such as with bronchitis or chronic cough from smoking)  · Being overweight or obese  · Hysterectomy or other pelvic surgery  · Nerve damage  Treatment  Different treatments  are available for stress incontinence including:  · Lifestyle changes such as reducing weight, quitting smoking, reducing drinks with caffeine or alcohol and bladder strengthening exercises  · Surgery of various types  · Various medical devices such as pessaries  Date Last Reviewed: 1/1/2017  © 3160-1459 [employer]. [address] [place] [id number]. All rights reserved. This information is not intended as a substitute for professional medical care. Always follow your healthcare professional's instructions.

## 2019-01-16 NOTE — PROGRESS NOTES
and name used as 2 pt ID's , pt allergies assessed, pt advised to stay 30 min post injection, pt verbalized understanding

## 2019-01-16 NOTE — PROGRESS NOTES
Subjective:       Patient ID: Moriah Cha is a 69 y.o. female.    Chief Complaint: Follow-up    Follow up multiple issues     HTN, lipids, osteoporosis.    BP doing well on her current medical regimen.     Sees Dr Derek Buck for GYN- he will take care of her mammograms.  Last mammogram was done in [place] in November of 2017.  She thinks she has had this done here locally in June.    Diogenes Vasquez nephrology, recently.  On amlodipine 10 mg daily per him.  Also given D once monthly in place of weekly, D is better.    DEXA reviewed- from fall 2018.  She is on meds.    Some urinary frequency and urgency- using a diaper, slight irritation.    Reviewed.  She will follow up with her Nephrologist.      Still with orthopedic issues, hip pain, walks with a cane.  DJD both hips seen on CT scans in [place].  Hurts to sit and walk, at least 6 months.  Quite severe, quite limited.  She saw somebody here and had xrays, some DJD, does not think she wants to go back to him.  She already has another orthopedist lined up for a few weeks from now..     Diverticulosis without diverticulitis on CT 4/17.    No symptoms.     Calcified uterine fibroid on CT 4/17.    Patient Active Problem List:     Essential hypertension     Other osteoporosis without current pathological fracture. see DEXA 10/18     Mixed hyperlipidemia     Osteoarthritis of both hips     CKD (chronic kidney disease) stage 3, GFR 30-59 ml/min     Diverticulosis of large intestine without hemorrhage: seen on CT scan April 2017     Intramural leiomyoma of uterus: see CT scan April 2017     Vitamin D deficiency disease     Mild intermittent asthma without complication     Stress incontinence in female     Secondary hyperparathyroidism of renal origin           Review of Systems   Constitutional: Negative for fatigue and fever.   Respiratory: Negative for shortness of breath.    Cardiovascular: Negative for chest pain.   Gastrointestinal: Negative for abdominal pain.    Genitourinary: Positive for frequency and urgency. Negative for difficulty urinating and hematuria.   Musculoskeletal: Positive for arthralgias and back pain.   Skin: Negative for rash.   Neurological: Negative for weakness and numbness.       Objective:      Physical Exam   Constitutional: She is oriented to person, place, and time. She appears well-developed and well-nourished.   HENT:   Head: Normocephalic and atraumatic.   Right Ear: External ear normal.   Left Ear: External ear normal.   Nose: Nose normal.   Mouth/Throat: Oropharynx is clear and moist. No oropharyngeal exudate.   Eyes: Conjunctivae and EOM are normal. No scleral icterus.   Neck: Normal range of motion. Neck supple. No JVD present. No thyromegaly present.   Cardiovascular: Normal rate and regular rhythm. Exam reveals no gallop.   Pulmonary/Chest: Effort normal.   Musculoskeletal: Normal range of motion. She exhibits no edema.   In a wheelchair   Lymphadenopathy:     She has no cervical adenopathy.   Neurological: She is alert and oriented to person, place, and time. No cranial nerve deficit. Coordination normal.   Skin: Skin is warm. No rash noted. No erythema.   Irregular lesion RLE; fibroma L arm   Psychiatric: She has a normal mood and affect. Her behavior is normal. Judgment and thought content normal.   Nursing note and vitals reviewed.      Assessment:       1. Essential hypertension    2. Mixed hyperlipidemia    3. CKD (chronic kidney disease) stage 3, GFR 30-59 ml/min    4. Vitamin D deficiency disease    5. Secondary hyperparathyroidism of renal origin    6. Other osteoporosis without current pathological fracture. see DEXA 10/18    7. Fibroma        Plan:         Moriah was seen today for follow-up.    Diagnoses and all orders for this visit:    Essential hypertension: continue regimen    Mixed hyperlipidemia: continue regimen    CKD (chronic kidney disease) stage 3, GFR 30-59 ml/min: stable.  Keep Nephrology follow up.  Hydration;  avoid nephrotoxins    Vitamin D deficiency disease: meds modified    Secondary hyperparathyroidism of renal origin    Other osteoporosis without current pathological fracture. see DEXA 10/18: continue regimen    Other orders (per her nephrologist)  -     ergocalciferol (ERGOCALCIFEROL) 50,000 unit Cap; Take 1 capsule (50,000 Units total) by mouth every 30 days.  -     mirabegron (MYRBETRIQ) 25 mg Tb24 ER tablet; Take 1 tablet (25 mg total) by mouth once daily.    Urinary urgency/frequency: recommended Uro-GYN; she is ambivalent    Dermatology consultation    Keep follow-up in Nephrology and with Orthopedics, she will let me know    Outside records for her mammogram    Pneumonia vaccine update and tetanus booster today

## 2019-02-25 ENCOUNTER — TELEPHONE (OUTPATIENT)
Dept: INTERNAL MEDICINE | Facility: CLINIC | Age: 69
End: 2019-02-25

## 2019-02-25 NOTE — TELEPHONE ENCOUNTER
Okay, form completed, on your desk.    She needs to make sure that she schedules were appointments in Nephrology and Orthopedics as we discussed in January.

## 2019-03-01 ENCOUNTER — TELEPHONE (OUTPATIENT)
Dept: INTERNAL MEDICINE | Facility: CLINIC | Age: 69
End: 2019-03-01

## 2019-03-01 NOTE — TELEPHONE ENCOUNTER
----- Message from Suzanne Horner sent at 3/1/2019  3:22 PM CST -----  Contact: [phone]  Patient is calling to check the status of an handicap form.    Please advise, thanks

## 2019-03-09 ENCOUNTER — TELEPHONE (OUTPATIENT)
Dept: INTERNAL MEDICINE | Facility: CLINIC | Age: 69
End: 2019-03-09

## 2019-03-11 RX ORDER — PRAVASTATIN SODIUM 20 MG/1
TABLET ORAL
Qty: 30 TABLET | Refills: 1 | Status: SHIPPED | OUTPATIENT
Start: 2019-03-11 | End: 2019-05-18 | Stop reason: SDUPTHER

## 2019-03-13 NOTE — TELEPHONE ENCOUNTER
Appointment on 3/13 cancelled...would you like me to call and schedule labs? On another date.  Thanks

## 2019-03-14 NOTE — TELEPHONE ENCOUNTER
We have tried to call, unable to reach, have left several messages.    Letter sent today, encouraging her to reschedule both office visit and lab work.

## 2019-03-20 ENCOUNTER — TELEPHONE (OUTPATIENT)
Dept: INTERNAL MEDICINE | Facility: CLINIC | Age: 69
End: 2019-03-20

## 2019-03-20 NOTE — TELEPHONE ENCOUNTER
Spoke to pt and she will wait to come in for her apt because she might have Hip Surgery and will wait until she have a date to come in for her Pre OP

## 2019-03-20 NOTE — TELEPHONE ENCOUNTER
----- Message from Annalee Cunha sent at 3/20/2019 12:40 PM CDT -----  Contact: SELF/[phone]  Pt called in regards to talking to the office about the purposes of her dr visit.       Please advise

## 2019-04-03 ENCOUNTER — TELEPHONE (OUTPATIENT)
Dept: INTERNAL MEDICINE | Facility: CLINIC | Age: 69
End: 2019-04-03

## 2019-04-03 NOTE — LETTER
April 3, 2019    Moriah Cha  [address] [facility] [address]             [facility] - Internal Medicine  [address]  [place] [address]  Phone: [phone]  Fax: [phone] Dear Ms. Cha:    I received your outside labs which were acceptable.  It appears that you are seeing an outside nephrologist who is coordinating your care for this.  Please let me know if you have any questions or concerns regarding your recent testing.      Please be sure to schedule your orthopedic follow-up as well as we had discussed prior.    I would recommend follow-up with me within the next 4-6 months and at that time, we can also coordinate your mammogram and other health maintenance.  If you have any questions or concerns or need a sooner appointment, please contact my office.      Sincerely,        Luciana Herman MD

## 2019-04-03 NOTE — TELEPHONE ENCOUNTER
Outside labs received dated March 4, 2019.  CBC shows a white count of 5.5, hemoglobin 12.1, hematocrit 39.0.  Platelets 283. Albumin creatinine ratio 9.6 PTH was 83 vitamin D was 40.  TSH 1.3  Metabolic panel acceptable other than creatinine of 1.21 with a GFR of 53. Urinalysis was acceptable.    It appears she is following with an outside nephrologist, Dr. Diogenes Vasquez    I sent a letter encouraging her to follow-up with me within the next 3-4 months time, sooner with problems in the interim.

## 2019-05-18 RX ORDER — PRAVASTATIN SODIUM 20 MG/1
TABLET ORAL
Qty: 30 TABLET | Refills: 1 | Status: SHIPPED | OUTPATIENT
Start: 2019-05-18 | End: 2019-07-17 | Stop reason: SDUPTHER

## 2019-06-03 ENCOUNTER — OFFICE VISIT (OUTPATIENT)
Dept: INTERNAL MEDICINE | Facility: CLINIC | Age: 69
End: 2019-06-03
Payer: MEDICARE

## 2019-06-03 VITALS — DIASTOLIC BLOOD PRESSURE: 82 MMHG | SYSTOLIC BLOOD PRESSURE: 138 MMHG | HEART RATE: 76 BPM

## 2019-06-03 DIAGNOSIS — N39.3 STRESS INCONTINENCE IN FEMALE: ICD-10-CM

## 2019-06-03 DIAGNOSIS — Z01.810 PREOP CARDIOVASCULAR EXAM: Primary | ICD-10-CM

## 2019-06-03 DIAGNOSIS — I10 ESSENTIAL HYPERTENSION: ICD-10-CM

## 2019-06-03 DIAGNOSIS — M16.12 PRIMARY LOCALIZED OSTEOARTHROSIS OF LEFT HIP: ICD-10-CM

## 2019-06-03 PROCEDURE — 99999 PR PBB SHADOW E&M-EST. PATIENT-LVL III: CPT | Mod: PBBFAC,,, | Performed by: INTERNAL MEDICINE

## 2019-06-03 PROCEDURE — 99215 OFFICE O/P EST HI 40 MIN: CPT | Mod: S$PBB,,, | Performed by: INTERNAL MEDICINE

## 2019-06-03 PROCEDURE — 99213 OFFICE O/P EST LOW 20 MIN: CPT | Mod: PBBFAC | Performed by: INTERNAL MEDICINE

## 2019-06-03 PROCEDURE — 99999 PR PBB SHADOW E&M-EST. PATIENT-LVL III: ICD-10-PCS | Mod: PBBFAC,,, | Performed by: INTERNAL MEDICINE

## 2019-06-03 PROCEDURE — 99215 PR OFFICE/OUTPT VISIT, EST, LEVL V, 40-54 MIN: ICD-10-PCS | Mod: S$PBB,,, | Performed by: INTERNAL MEDICINE

## 2019-06-03 RX ORDER — BACLOFEN 20 MG/1
TABLET ORAL
COMMUNITY
End: 2021-03-08

## 2019-06-03 NOTE — PROGRESS NOTES
CHIEF COMPLAINT:  The patient is here for preoperative medical optimization prior to surgery.    SURGICAL PROCEDURE: L JAUN    SURGEON: Dr Alvarez Etienne    HISTORY OF PRESENT ILLNESS:  The reason for the visit today as a preoperative consultation prior to surgery.    The patient does not have any active cardiac conditions (does NOT have unstable coronary artery disease, decompensated heart failure, arrhythmia or severe valvular heart disease).    The patient has an exercise capacity of greater than 4 METS without symptoms.    The patient has no clinical risk factors of prior heart failure, stroke, TIA, or hepatic insufficiency.  The patient has no respiratory illness.  Chronic medical issues have been stable.  Kidney disease has been stable; she has seen a nephrologist recently and found to be stable.  Creatinine between 1.3-1.6.    BP stable.    Will hold fosamax for surgery.    Asks about TOMAS.   Medication has not helped.  Will refer to Urology.    Patient Active Problem List   Diagnosis    Essential hypertension    Other osteoporosis without current pathological fracture. see DEXA 10/18    Mixed hyperlipidemia    Osteoarthritis of both hips    CKD (chronic kidney disease) stage 3, GFR 30-59 ml/min    Diverticulosis of large intestine without hemorrhage: seen on CT scan April 2017    Intramural leiomyoma of uterus: see CT scan April 2017    Vitamin D deficiency disease    Mild intermittent asthma without complication    Stress incontinence in female    Secondary hyperparathyroidism of renal origin    Primary localized osteoarthrosis of left hip       PAST MEDICAL HISTORY:  Patient Active Problem List   Diagnosis    Essential hypertension    Other osteoporosis without current pathological fracture. see DEXA 10/18    Mixed hyperlipidemia    Osteoarthritis of both hips    CKD (chronic kidney disease) stage 3, GFR 30-59 ml/min    Diverticulosis of large intestine without hemorrhage: seen on CT scan  April 2017    Intramural leiomyoma of uterus: see CT scan April 2017    Vitamin D deficiency disease    Mild intermittent asthma without complication    Stress incontinence in female    Secondary hyperparathyroidism of renal origin    Primary localized osteoarthrosis of left hip       SOCIAL HISTORY:  Former smoker, quit in the 90s.    FAMILY HISTORY:  Family History   Problem Relation Age of Onset    Heart disease Mother         MI    Diabetes Father     Hypertension Father     Arthritis Sister     Meningitis Brother     Stroke Maternal Aunt     Stroke Maternal Grandmother        MEDS AND ALLERGIES: Reviewed/reconciled as per MEDCARD and ALLERGY CARD.    REVIEW OF SYSTEMS:  GENERAL: Feels well  HEENT: No blurred vision, headache or ear pain.  CV: No chest pain, pressure, tightness or shortness of breath.  Respiratory: No cough or wheezing.  Gastrointestinal: No nausea, vomiting or diarrhea  Genitourinary: No dysuria or hematuria.  Urgency and stress incontinence  Psych:  Denies depression, SI or HI.  No thought disorder.    PE:  HEENT: oropharynx clear.  TMs clear.  Neck: No JVD or bruits  Lungs: Clear to auscultation bilaterally  CV: Regular rate and rhythm without murmurs  Abdomen: Soft and nontender with no masses  Extremities: No edema, clubbing or cyanosis  Psych: Alert and oriented x3, no SI or HI.    DATA:  EKG acceptable.  Labs acceptable; creatinine 1.6.  Mild chronic anemia.    Moriah was seen today for pre-op exam.    Diagnoses and all orders for this visit:    Preop cardiovascular exam    Primary localized osteoarthrosis of left hip    Essential hypertension    Stress incontinence in female  -     Ambulatory referral to Urology      PLAN:  1. Continue current medications, MEDCARD reconciled with the patient  2. Discontinue NSAIDs and aspirin 5-7 days prior to procedure    PREOPERATIVE RISK ASSESSMENT AND RECOMMENDATIONS:  The patient presents for medical evaluation.  She is undergoing  noncardiac surgery which carries a low risk of cardiac complications.    The patient has stable cardiac conditions.  Exercise capacity is very limited by orthopedic issues.  She has stable clinical risk factors for surgery.  She would be considered average risk for surgery.    CLEARANCE:  The patient is medically optimized for surgery.  No further testing or intervention is needed.

## 2019-06-03 NOTE — LETTER
Kacie 3, 2019        Alvarez Etienne MD  [facility] [address]             [facility] - Internal Medicine  [address]  [place] [address]  Phone: [phone]  Fax: [phone]   Patient: Moriah Cha   MR Number: [id number]   YOB: 1950   Date of Visit: 6/3/2019       Dear Dr. Etienne:    Thank you for referring Moriah Cha to me for evaluation. Attached you will find relevant portions of my assessment and plan of care.    If you have questions, please do not hesitate to call me. I look forward to following Moriah Cha along with you.    Sincerely,      Luciana Herman MD, FACP            CC  No Recipients    Enclosure

## 2019-06-05 ENCOUNTER — TELEPHONE (OUTPATIENT)
Dept: INTERNAL MEDICINE | Facility: CLINIC | Age: 69
End: 2019-06-05

## 2019-06-05 NOTE — TELEPHONE ENCOUNTER
----- Message from Suzanne Horner sent at 6/5/2019  9:21 AM CDT -----  Contact: [phone]  Patient is requesting a call from the office concerning an Urology appointment. She has a few questions.     Please advise, thanks

## 2019-06-13 ENCOUNTER — OFFICE VISIT (OUTPATIENT)
Dept: UROLOGY | Facility: CLINIC | Age: 69
End: 2019-06-13
Payer: MEDICARE

## 2019-06-13 VITALS
SYSTOLIC BLOOD PRESSURE: 124 MMHG | HEART RATE: 90 BPM | HEIGHT: 71 IN | DIASTOLIC BLOOD PRESSURE: 73 MMHG | WEIGHT: 192.88 LBS | BODY MASS INDEX: 27 KG/M2

## 2019-06-13 DIAGNOSIS — N39.46 MIXED STRESS AND URGE URINARY INCONTINENCE: Primary | ICD-10-CM

## 2019-06-13 PROCEDURE — 51701 PR INSERTION OF NON-INDWELLING BLADDER CATHETERIZATION FOR RESIDUAL UR: ICD-10-PCS | Mod: S$PBB,,, | Performed by: UROLOGY

## 2019-06-13 PROCEDURE — 99205 OFFICE O/P NEW HI 60 MIN: CPT | Mod: S$PBB,25,, | Performed by: UROLOGY

## 2019-06-13 PROCEDURE — 99213 OFFICE O/P EST LOW 20 MIN: CPT | Mod: PBBFAC,25 | Performed by: UROLOGY

## 2019-06-13 PROCEDURE — 99999 PR PBB SHADOW E&M-EST. PATIENT-LVL III: CPT | Mod: PBBFAC,,, | Performed by: UROLOGY

## 2019-06-13 PROCEDURE — 51701 INSERT BLADDER CATHETER: CPT | Mod: PBBFAC | Performed by: UROLOGY

## 2019-06-13 PROCEDURE — 99999 PR PBB SHADOW E&M-EST. PATIENT-LVL III: ICD-10-PCS | Mod: PBBFAC,,, | Performed by: UROLOGY

## 2019-06-13 PROCEDURE — 99205 PR OFFICE/OUTPT VISIT, NEW, LEVL V, 60-74 MIN: ICD-10-PCS | Mod: S$PBB,25,, | Performed by: UROLOGY

## 2019-06-13 PROCEDURE — 51701 INSERT BLADDER CATHETER: CPT | Mod: S$PBB,,, | Performed by: UROLOGY

## 2019-06-13 PROCEDURE — 87086 URINE CULTURE/COLONY COUNT: CPT

## 2019-06-13 RX ORDER — OXYBUTYNIN CHLORIDE 10 MG/1
10 TABLET, EXTENDED RELEASE ORAL DAILY
Qty: 30 TABLET | Refills: 11 | Status: SHIPPED | OUTPATIENT
Start: 2019-06-13 | End: 2019-09-12 | Stop reason: ALTCHOICE

## 2019-06-13 NOTE — PROGRESS NOTES
CHIEF COMPLAINT:    Mrs. Cha is a 69 y.o. female presenting for a consultation at the request of Dr. Luciana Herman. Patient presents with mixed incontinence.    PRESENTING ILLNESS:    Moriah Cha is a 69 y.o. female who has a long history of mild stress incontinence.  She also has bilateral degenerative hip disease and is to have surgery on the  with Dr. Alvarez Etienne at [facility].  Dr. Herman is her primary and referred her to see if anything could be done about her incontinence as the patient has issues with urge incontinence.    Urge incontinence started about 1 year ago.  She denies any gross hematuria or recurrent UTI.  She states that it had a rather sudden onset.  She has been treated in the past with Myrbetriq which was ineffective.  She uses a pull up but it does not fit her well and ends up fitting like a pull up thong as it cuts into her leg crease.  She has to wear one at night and changes it at least once.  Because of the ill fit, the urine can run out the back and sides onto the bedding.  She has nocturia x 3-4, frequency difficult to quantify.  It is very hard for her to ambulate to a toilet as she has bilateral hip pain, lower back pain, uses a walker.  She estimates that some of the issue is the immobility and some is due to the bladder itself.  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        , uterus in place, has a fibroid, (had post menopausal bleeding which was investigated with endometrial biopsy and found to be negative.)  not sexually active (no partner), bowels are normal.     REVIEW OF SYSTEMS:    Review of Systems   Constitutional: Negative.    HENT: Negative.    Eyes: Negative.    Respiratory: Negative.    Cardiovascular: Negative.    Gastrointestinal: Negative.    Genitourinary: Positive for urgency.        Mixed incontinence   Musculoskeletal: Positive for back pain and joint pain.         Unsteady on her feet   Skin: Negative.    Neurological: Negative.    Endo/Heme/Allergies: Negative.    Psychiatric/Behavioral: Negative.        PATIENT HISTORY:    Past Medical History:   Diagnosis Date    Diverticulosis of large intestine without hemorrhage: seen on CT scan 2018    Essential hypertension 2018    Hyperlipidemia     Intramural leiomyoma of uterus: see CT scan 2018    Osteoporosis        Past Surgical History:   Procedure Laterality Date    TONSILLECTOMY         Family History   Problem Relation Age of Onset    Heart disease Mother         MI    Diabetes Father     Hypertension Father     Arthritis Sister     Meningitis Brother     Stroke Maternal Aunt     Stroke Maternal Grandmother      Socioeconomic History    Marital status: Single   Tobacco Use    Smoking status: Former Smoker     Packs/day: 0.25     Years: 15.00     Pack years: 3.75     Last attempt to quit: 6/3/1997     Years since quittin.0    Smokeless tobacco: Never Used   Substance and Sexual Activity    Alcohol use: Yes     Comment: rarely    Drug use: No    Sexual activity: Not on file       Allergies:  Patient has no known allergies.    Medications:  Outpatient Encounter Medications as of 2019   Medication Sig Dispense Refill    amLODIPine (NORVASC) 10 MG tablet Take 10 mg by mouth once daily.  6    baclofen (LIORESAL) 20 MG tablet Take by mouth as needed.       ergocalciferol (ERGOCALCIFEROL) 50,000 unit Cap Take 1 capsule (50,000 Units total) by mouth every 30 days. 3 capsule 3    pravastatin (PRAVACHOL) 20 MG tablet TAKE 1 TABLET BY MOUTH DAILY 30 tablet 1    valsartan (DIOVAN) 320 MG tablet Take 1 tablet (320 mg total) by mouth once daily. 30 tablet 12    albuterol 90 mcg/actuation inhaler Inhale 2 puffs into the lungs every 8 (eight) hours. 1 Inhaler 12    alendronate (FOSAMAX) 70 MG tablet Take 1 tablet (70 mg total) by mouth every 7 days. 4 tablet 12     oxybutynin (DITROPAN-XL) 10 MG 24 hr tablet Take 1 tablet (10 mg total) by mouth once daily. 30 tablet 11    vit B complx C/folic acid/zinc (DIALYVITE 800 WITH ZINC 15 ORAL) Take by mouth.       No facility-administered encounter medications on file as of 6/13/2019.          PHYSICAL EXAMINATION:    The patient generally appears in good health, is appropriately interactive, and is in no apparent distress.    Skin: No lesions.    Mental: Cooperative with normal affect.    Neuro: Grossly intact.    HEENT: Normal. No evidence of lymphadenopathy.    Chest:  normal inspiratory effort.    Abdomen:  Soft, non-tender. No masses or organomegaly. Bladder is not palpable. No evidence of flank discomfort. No evidence of inguinal hernia.    Extremities: No clubbing, cyanosis, or edema    Could not put up in stirrups so had her recline and exam was done without a speculum.    Normal external female genitalia  Urethral meatus is normal  Urethra and bladder are nontender to bimanual exam  Well supported anteriorly and posteriorly   Uterus and cervix are normal  No adnexal masses  Amount in bladder was 20 ml    LABS:    Lab Results   Component Value Date    BUN 28 (H) 01/09/2019    CREATININE 1.4 01/09/2019     IMPRESSION:    Encounter Diagnoses   Name Primary?    Mixed stress and urge urinary incontinence Yes       PLAN:    1.  The catheterized specimen was sent for culture  2.  Bladder Matters book given for behavioral therapy  3.  Oxybutynin XL 10 mg prescribed  4.  Follow up in 3 months  5.  Discussed washable female incontinence briefs which may fit better (though will endeavor to get her out of protective underwear)    6.  Briefly discussed that there are tertiary therapies that can work if medications fail.      Copy to: Dr. Luciana Herman

## 2019-06-13 NOTE — LETTER
June 13, 2019      Luciana Herman MD  [address]  [facility] [address]           [facility] - Urology 4th Floor  [address]  [facility] [address]  Phone: [phone]          Patient: Moriah Cha   MR Number: [id number]   YOB: 1950   Date of Visit: 6/13/2019       Dear Dr. Luciana Herman:    Thank you for referring Moriah Cha to me for evaluation. Attached you will find relevant portions of my assessment and plan of care.    If you have questions, please do not hesitate to call me. I look forward to following Moriah Cha along with you.    Sincerely,    Tia Jane MD    Enclosure  CC:  No Recipients    If you would like to receive this communication electronically, please contact [email or web address] or [phone] to request more information on [facility] [employer] access.    For providers and/or their staff who would like to refer a patient to [facility], please contact us through our one-stop-shop provider referral line, [facility], at [phone].    If you feel you have received this communication in error or would no longer like to receive these types of communications, please e-mail [email or web address]

## 2019-06-15 LAB — BACTERIA UR CULT: NO GROWTH

## 2019-06-21 ENCOUNTER — TELEPHONE (OUTPATIENT)
Dept: INTERNAL MEDICINE | Facility: CLINIC | Age: 69
End: 2019-06-21

## 2019-06-21 NOTE — TELEPHONE ENCOUNTER
----- Message from Manju Strickland sent at 6/21/2019 10:18 AM CDT -----  Contact: Patient [phone]  Patient is requesting a call back in regards to needed medical clearance.    Please call and advise  Thank you

## 2019-06-21 NOTE — TELEPHONE ENCOUNTER
Spoke with sophia and she stated that on may 30th she requested a clearance letter labs and ekg etc was faxed over but she had heard anything back   Sophia stated pt is due for surgery Monday and she would like to know if the covering provider can give this info   Notified her that dr farnsworth was out and doesn't return until Monday

## 2019-06-21 NOTE — TELEPHONE ENCOUNTER
spoke with pt notified her im awaiting a response from NP because her MD is out of clinic on today

## 2019-06-21 NOTE — TELEPHONE ENCOUNTER
----- Message from Willow Cha sent at 6/21/2019  8:18 AM CDT -----  Contact: Sophia/ / [phone]  Office states that they are calling to speak with someone in regards to a clearance for the pt. She states that the pt is set to have surgery on her left hip on Monday 06/24/19. Please call back as soon as possible.      Thanks

## 2019-07-17 RX ORDER — PRAVASTATIN SODIUM 20 MG/1
TABLET ORAL
Qty: 30 TABLET | Refills: 1 | Status: SHIPPED | OUTPATIENT
Start: 2019-07-17 | End: 2019-10-30 | Stop reason: SDUPTHER

## 2019-07-17 RX ORDER — ERGOCALCIFEROL 1.25 MG/1
CAPSULE ORAL
Qty: 12 CAPSULE | Refills: 3 | Status: SHIPPED | OUTPATIENT
Start: 2019-07-17 | End: 2019-11-03 | Stop reason: SDUPTHER

## 2019-07-17 RX ORDER — VALSARTAN 320 MG/1
TABLET ORAL
Qty: 30 TABLET | Refills: 12 | Status: SHIPPED | OUTPATIENT
Start: 2019-07-17 | End: 2019-10-30 | Stop reason: SDUPTHER

## 2019-07-26 ENCOUNTER — TELEPHONE (OUTPATIENT)
Dept: INTERNAL MEDICINE | Facility: CLINIC | Age: 69
End: 2019-07-26

## 2019-07-26 NOTE — TELEPHONE ENCOUNTER
I do not understand what this patient wants.  Bone density was a done in 2018.    We can mail her a copy of her bone density and she can take it to her nephrologist if she likes.    She could also sign up for [facility] and have access to all her medical records electronically

## 2019-07-26 NOTE — TELEPHONE ENCOUNTER
----- Message from Chuyita Smith sent at 7/26/2019  5:42 PM CDT -----  Contact: Pt  Pt missed a call and would like the nurse to return their call.    Pt can be reached at [phone]

## 2019-07-26 NOTE — TELEPHONE ENCOUNTER
----- Message from Charmaine Zavaleta sent at 7/26/2019  4:10 PM CDT -----  Contact: self/[phone]  Patient called in regards needing to talk with Dr Herman medical assistant about her nephrology at [facility]. Bone Density order to be sent to [facility] - Dr Vasquez. Please call and advise. Thank you

## 2019-07-26 NOTE — TELEPHONE ENCOUNTER
Pt states that she was advised that her bone density was faxed to [facility] however they [facility] has notified pt that they haven't received it. Pt states that she doesn't want to repeat the test since she had it done already.

## 2019-07-27 RX ORDER — ALENDRONATE SODIUM 70 MG/1
70 TABLET ORAL
Qty: 4 TABLET | Refills: 12 | Status: SHIPPED | OUTPATIENT
Start: 2019-07-27 | End: 2019-10-30 | Stop reason: SDUPTHER

## 2019-09-09 NOTE — PROGRESS NOTES
CHIEF COMPLAINT:    Mrs. Cha is a 69 y.o. female presenting for a a follow up on mixed incontinence    PRESENTING ILLNESS:    Moriah Cha is a 69 y.o. female who returns stating that the urge incontinence is somewhat improved but she still has nocturnal enuresis and wakes up wet even from a nap.  The numbness in her left leg is improving and she is getting around better.  She is not to the point that she wants to try tertiary therapies.  The Myrbegron did nothing for her.     REVIEW OF SYSTEMS:    Review of Systems   Constitutional: Negative.    HENT: Negative.    Eyes: Negative.    Respiratory: Negative.    Cardiovascular: Negative.    Gastrointestinal: Negative.    Genitourinary:        Nocturnal enuresis   Musculoskeletal:        Uses a cane to walk   Skin: Negative.    Neurological: Positive for sensory change.   Endo/Heme/Allergies: Negative.    Psychiatric/Behavioral: Negative.        PATIENT HISTORY:    Past Medical History:   Diagnosis Date    Diverticulosis of large intestine without hemorrhage: seen on CT scan 2018    Essential hypertension 2018    Hyperlipidemia     Intramural leiomyoma of uterus: see CT scan 2018    Osteoporosis        Past Surgical History:   Procedure Laterality Date    TONSILLECTOMY         Family History   Problem Relation Age of Onset    Heart disease Mother         MI    Diabetes Father     Hypertension Father     Arthritis Sister     Meningitis Brother     Stroke Maternal Aunt     Stroke Maternal Grandmother      Socioeconomic History    Marital status: Single   Tobacco Use    Smoking status: Former Smoker     Packs/day: 0.25     Years: 15.00     Pack years: 3.75     Last attempt to quit: 6/3/1997     Years since quittin.2    Smokeless tobacco: Never Used   Substance and Sexual Activity    Alcohol use: Yes     Comment: rarely    Drug use: No    Sexual activity: Not on file       Allergies:  Patient has no known  allergies.    Medications:  Outpatient Encounter Medications as of 9/12/2019   Medication Sig Dispense Refill    alendronate (FOSAMAX) 70 MG tablet TAKE 1 TABLET (70 MG TOTAL) BY MOUTH EVERY 7 DAYS. 4 tablet 12    amLODIPine (NORVASC) 10 MG tablet Take 10 mg by mouth once daily.  6    baclofen (LIORESAL) 20 MG tablet Take by mouth as needed.       ergocalciferol (ERGOCALCIFEROL) 50,000 unit Cap Take 1 capsule (50,000 Units total) by mouth every 30 days. 3 capsule 3    ergocalciferol (ERGOCALCIFEROL) 50,000 unit Cap TAKE ONE CAPSULE BY MOUTH ONE TIME PER WEEK 12 capsule 3    pravastatin (PRAVACHOL) 20 MG tablet TAKE 1 TABLET BY MOUTH DAILY 30 tablet 1    valsartan (DIOVAN) 320 MG tablet TAKE 1 TABLET BY MOUTH EVERY DAY 30 tablet 12    vit B complx C/folic acid/zinc (DIALYVITE 800 WITH ZINC 15 ORAL) Take by mouth.      [DISCONTINUED] oxybutynin (DITROPAN-XL) 10 MG 24 hr tablet Take 1 tablet (10 mg total) by mouth once daily. 30 tablet 11    albuterol 90 mcg/actuation inhaler Inhale 2 puffs into the lungs every 8 (eight) hours. 1 Inhaler 12    darifenacin (ENABLEX) 7.5 MG Tb24 Take 1 tablet (7.5 mg total) by mouth once daily. 30 tablet 11     No facility-administered encounter medications on file as of 9/12/2019.          PHYSICAL EXAMINATION:    The patient generally appears in good health, is appropriately interactive, and is in no apparent distress.    Skin: No lesions.    Mental: Cooperative with normal affect.    Neuro: Grossly intact.    HEENT: Normal. No evidence of lymphadenopathy.    Chest:  normal inspiratory effort.    Abdomen:  Soft, non-tender. No masses or organomegaly. Bladder is not palpable. No evidence of flank discomfort. No evidence of inguinal hernia.    Extremities: No clubbing, cyanosis, or edema      LABS:    Lab Results   Component Value Date    BUN 28 (H) 01/09/2019    CREATININE 1.4 01/09/2019     UA 1.010, PH 7, otherwise, negative    IMPRESSION:    Encounter Diagnoses   Name  Primary?    Urge incontinence Yes    Nocturnal enuresis        PLAN:    1.  Discontinue the oxybutynin  2.  darifenacin 7.5 mg   3.  Discussed can increase the dose to 15 mg  4.  Also discussed the next step of urodynamics and tertiary therapies including Botox, InterStim and PTNS.

## 2019-09-12 ENCOUNTER — OFFICE VISIT (OUTPATIENT)
Dept: UROLOGY | Facility: CLINIC | Age: 69
End: 2019-09-12
Payer: MEDICARE

## 2019-09-12 VITALS
HEIGHT: 71 IN | DIASTOLIC BLOOD PRESSURE: 70 MMHG | BODY MASS INDEX: 27.04 KG/M2 | SYSTOLIC BLOOD PRESSURE: 102 MMHG | WEIGHT: 193.13 LBS | HEART RATE: 86 BPM

## 2019-09-12 DIAGNOSIS — N39.44 NOCTURNAL ENURESIS: ICD-10-CM

## 2019-09-12 DIAGNOSIS — N39.41 URGE INCONTINENCE: Primary | ICD-10-CM

## 2019-09-12 PROCEDURE — 99999 PR PBB SHADOW E&M-EST. PATIENT-LVL III: ICD-10-PCS | Mod: PBBFAC,,, | Performed by: UROLOGY

## 2019-09-12 PROCEDURE — 99213 PR OFFICE/OUTPT VISIT, EST, LEVL III, 20-29 MIN: ICD-10-PCS | Mod: S$PBB,,, | Performed by: UROLOGY

## 2019-09-12 PROCEDURE — 99213 OFFICE O/P EST LOW 20 MIN: CPT | Mod: S$PBB,,, | Performed by: UROLOGY

## 2019-09-12 PROCEDURE — 99999 PR PBB SHADOW E&M-EST. PATIENT-LVL III: CPT | Mod: PBBFAC,,, | Performed by: UROLOGY

## 2019-09-12 PROCEDURE — 81002 URINALYSIS NONAUTO W/O SCOPE: CPT | Mod: PBBFAC | Performed by: UROLOGY

## 2019-09-12 PROCEDURE — 99213 OFFICE O/P EST LOW 20 MIN: CPT | Mod: PBBFAC | Performed by: UROLOGY

## 2019-09-12 RX ORDER — DARIFENACIN 7.5 MG/1
7.5 TABLET, EXTENDED RELEASE ORAL DAILY
Qty: 30 TABLET | Refills: 11 | Status: SHIPPED | OUTPATIENT
Start: 2019-09-12 | End: 2019-12-27 | Stop reason: SDUPTHER

## 2019-09-13 PROBLEM — N39.44 NOCTURNAL ENURESIS: Status: ACTIVE | Noted: 2019-09-13

## 2019-10-24 ENCOUNTER — TELEPHONE (OUTPATIENT)
Dept: UROLOGY | Facility: CLINIC | Age: 69
End: 2019-10-24

## 2019-10-24 NOTE — TELEPHONE ENCOUNTER
----- Message from Isamar Moore sent at 10/24/2019  1:30 PM CDT -----  Contact: Moriah    [phone]   Ref: DARIFENACIN   (Enablex)  . 7.5mgs.    Is not helping and has not made any difference at all.    Pharmacy:   [facility]. // Caller says the urgency occurs when she first wakes up and she usually cannot get to the bathroom fast enough.    Pls call today.

## 2019-10-30 ENCOUNTER — OFFICE VISIT (OUTPATIENT)
Dept: INTERNAL MEDICINE | Facility: CLINIC | Age: 69
End: 2019-10-30
Payer: MEDICARE

## 2019-10-30 VITALS
DIASTOLIC BLOOD PRESSURE: 70 MMHG | SYSTOLIC BLOOD PRESSURE: 120 MMHG | WEIGHT: 196.19 LBS | BODY MASS INDEX: 29.06 KG/M2 | HEIGHT: 69 IN

## 2019-10-30 DIAGNOSIS — B37.2 CANDIDAL SKIN INFECTION: ICD-10-CM

## 2019-10-30 DIAGNOSIS — N25.81 SECONDARY HYPERPARATHYROIDISM OF RENAL ORIGIN: ICD-10-CM

## 2019-10-30 DIAGNOSIS — E78.2 MIXED HYPERLIPIDEMIA: ICD-10-CM

## 2019-10-30 DIAGNOSIS — N18.30 CKD (CHRONIC KIDNEY DISEASE) STAGE 3, GFR 30-59 ML/MIN: ICD-10-CM

## 2019-10-30 DIAGNOSIS — M81.8 OTHER OSTEOPOROSIS WITHOUT CURRENT PATHOLOGICAL FRACTURE: ICD-10-CM

## 2019-10-30 DIAGNOSIS — M16.12 PRIMARY LOCALIZED OSTEOARTHROSIS OF LEFT HIP: ICD-10-CM

## 2019-10-30 DIAGNOSIS — I10 ESSENTIAL HYPERTENSION: Primary | ICD-10-CM

## 2019-10-30 PROCEDURE — 99999 PR PBB SHADOW E&M-EST. PATIENT-LVL III: CPT | Mod: PBBFAC,,, | Performed by: INTERNAL MEDICINE

## 2019-10-30 PROCEDURE — 99213 OFFICE O/P EST LOW 20 MIN: CPT | Mod: PBBFAC | Performed by: INTERNAL MEDICINE

## 2019-10-30 PROCEDURE — 99214 PR OFFICE/OUTPT VISIT, EST, LEVL IV, 30-39 MIN: ICD-10-PCS | Mod: S$PBB,,, | Performed by: INTERNAL MEDICINE

## 2019-10-30 PROCEDURE — 99999 PR PBB SHADOW E&M-EST. PATIENT-LVL III: ICD-10-PCS | Mod: PBBFAC,,, | Performed by: INTERNAL MEDICINE

## 2019-10-30 PROCEDURE — 99214 OFFICE O/P EST MOD 30 MIN: CPT | Mod: S$PBB,,, | Performed by: INTERNAL MEDICINE

## 2019-10-30 RX ORDER — ALENDRONATE SODIUM 70 MG/1
70 TABLET ORAL
Qty: 4 TABLET | Refills: 12 | Status: SHIPPED | OUTPATIENT
Start: 2019-10-30 | End: 2020-11-15

## 2019-10-30 RX ORDER — BACLOFEN 20 MG/1
TABLET ORAL
Status: CANCELLED | OUTPATIENT
Start: 2019-10-30

## 2019-10-30 RX ORDER — PRAVASTATIN SODIUM 20 MG/1
20 TABLET ORAL DAILY
Qty: 30 TABLET | Refills: 12 | Status: SHIPPED | OUTPATIENT
Start: 2019-10-30 | End: 2019-12-01 | Stop reason: SDUPTHER

## 2019-10-30 RX ORDER — VALSARTAN 320 MG/1
320 TABLET ORAL DAILY
Qty: 30 TABLET | Refills: 12 | Status: SHIPPED | OUTPATIENT
Start: 2019-10-30 | End: 2020-11-18 | Stop reason: SDUPTHER

## 2019-10-30 RX ORDER — AMLODIPINE BESYLATE 10 MG/1
10 TABLET ORAL DAILY
Qty: 30 TABLET | Refills: 6 | Status: SHIPPED | OUTPATIENT
Start: 2019-10-30

## 2019-10-30 RX ORDER — ERGOCALCIFEROL 1.25 MG/1
50000 CAPSULE ORAL
Qty: 3 CAPSULE | Refills: 3 | Status: CANCELLED
Start: 2019-10-30

## 2019-10-30 NOTE — PATIENT INSTRUCTIONS
Prevention Guidelines, Women Ages 65 and Older  Screening tests and vaccines are an important part of managing your health. Health counseling is essential, too. Below are guidelines for these, for women ages 65 and older. Talk with your healthcare provider to make sure youre up to date on what you need.  Screening Who needs it How often   Type 2 diabetes or prediabetes All adults beginning at age 45 and adults without symptoms at any age who are overweight or obese and have 1 or more additional risk factors for diabetes At least every 3 years   Alcohol misuse All women in this age group At routine exams   Blood pressure All women in this age group Every 2 years if your blood pressure is less than 120/80 mm Hg; yearly if your systolic blood pressure is 120 to 139 mm Hg, or your diastolic blood pressure reading is 80 to 89 mm Hg   Breast cancer All women in this age group Yearly mammogram and clinical breast exam1   Cervical cancer Only women who had abnormal screening results before age 65 Talk with your healthcare provider   Chlamydia Women at increased risk for infection At routine exams   Colorectal cancer All women in this age group1 Flexible sigmoidoscopy every 5 years, or colonoscopy every 10 years, or double-contrast barium enema every 5 years; yearly fecal occult blood test or fecal immunochemical test; or a stool DNA test as often as your healthcare provider advises; talk with your healthcare provider about which tests are best for you   Depression All women in this age group At routine exams   Gonorrhea Sexually active women at increased risk for infection At routine exams   Hepatitis C Anyone at increased risk; 1 time for those born between 1945 and 1965 At routine exams   High cholesterol or triglycerides All women in this age group who are at risk for coronary artery disease At least every 5 years   HIV Women at increased risk for infection - talk with your healthcare provider At routine exams   Lung  cancer Adults age 55 to 80 who have smoked Yearly screening in smokers with 30 pack-year history of smoking or who quit within 15 years   Obesity All women in this age group At routine exams   Osteoporosis All women in this age group Bone density test at age 65, then follow-up as advised by your healthcare provider   Syphilis Women at increased risk for infection - talk with your healthcare provider At routine exams   Thyroid-Stimulating Hormone (TSH) All women in this age group Every 5 years   Tuberculosis Women at increased risk for infection - talk with your healthcare provider Ask your healthcare provider   Vision All women in this age group Every 1 to 2 years; if you have a chronic health condition, ask your healthcare provider if you need exams more often   Vaccine Who needs it How often   Chickenpox (varicella) All women in this age group who have no record of this infection or vaccine 2 doses; second dose should be given at least 4 weeks after the first dose   Hepatitis A Women at increased risk for infection - talk with your healthcare provider 2 doses given 6 months apart   Hepatitis B Women at increased risk for infection - talk with your healthcare provider 3 doses over 6 months; second dose should be given 1 month after the first dose; the third dose should be given at least 2 months after the second dose and at least 4 months after the first dose   Haemophilus influenza Type B (HIB) Women at increased risk for infection - talk with your healthcare provider 1 to 3 doses   Influenza (flu) All women in this age group Once a year   Pneumococcal conjugate vaccine (PCV13) and pneumococcal polysaccharide vaccine (PPSV23) All women in this age group 1 dose of each vaccine   Tetanus/diphtheria/pertussis (Td/Tdap) booster All women in this age group Td every 10 years, or a one-time dose of Tdap instead of a Td booster after age 18, then Td every 10 years   Zoster All women in this age group 1 dose   Counseling  Who needs it How often   Diet and exercise Women who are overweight or obese When diagnosed, and then at routine exams   Fall prevention (exercise and vitamin D supplements) All women in this age group At routine exams   Sexually transmitted infection prevention Women at increased risk for infection - talk with your healthcare provider At routine exams   Use of daily aspirin Women ages 55 and up in this age group who are at risk for cardiovascular health problems such as stroke When your risk is known   Use of tobacco and the health effects it can cause All women in this age group Every exam   1American [employer] Society  Date Last Reviewed: 8/9/2015  © 1668-8738 [employer]. [address] [place] [address]. All rights reserved. This information is not intended as a substitute for professional medical care. Always follow your healthcare professional's instructions.          Candida Skin Infection (Adult)  Candida is type of yeast. It grows naturally on the skin and in the mouth. If it grows out of control, it can cause an infection. Candida can cause infections in the genital area, skin folds, in the mouth, and under the breasts. Anyone can get this infection. It is more common in a person with a weak immune system, such as from diabetes, HIV, or cancer. Its also more common in someone who has been on antibiotic therapy. And its more common people who are overweight or who have incontinence. Wearing tight-fitting clothing and taking part in activities with lots of skin-to-skin contact can also put you at risk.  Candida causes the skin to become bright red and inflamed. The border of the infected part of the skin is often raised. The infection causes pain and itching. Sometimes the skin peels and bleeds. In the mouth, candida is called thrush, and may cause white thickened areas.  A Candida rash is most often treated with an antifungal cream or ointment. The rash will clear a few days after  starting the medicine. Infections that dont go away may need a prescription medicine. In rare cases, a bacterial infection can also occur.  Home care  Your healthcare provider will recommend an antifungal cream or ointment for the rash. He or she may also prescribe a medicine for the itch. Follow all instructions for using these medicines. Dont use cornstarch powder. Cornstarch can cause the Candida infection to get worse.  General care:  · Keep your skin clean by washing the area twice a day.  · Use the cream as directed until your rash is gone. Once the skin has healed, keep it dry to prevent another infection.   · If you are overweight, talk with your healthcare provider about a plan to lose excess weight.  · Avoid clothes that fit tightly.  Follow-up care  Follow up with your healthcare provider, or as advised. Your rash will clear in 7 to 14 days. Call your healthcare provider if the rash is not gone after 14 days.  When to seek medical advice  Call your healthcare provider right away if any of these occur:  · Pain or redness that gets worse or spreads  · Fluid coming from the skin  · Yellow crusts on the skin  · Fever of 100.4°F (38°C) or higher, or as directed by your healthcare provider  Date Last Reviewed: 9/1/2016  © 9650-5449 [employer]. [address] [place] [address], [place] [address]. All rights reserved. This information is not intended as a substitute for professional medical care. Always follow your healthcare professional's instructions.      LOTRIMIN AF cream at night  LOTRIMIN AF powder during the day

## 2019-10-30 NOTE — PROGRESS NOTES
"Subjective:       Patient ID: Moriah Cha is a 69 y.o. female.    Chief Complaint: Follow-up    Follow-up multiple medical issues, "annual."    BP doing well    HTN, lipids, osteoporosis.    Has been out of her prescription vitamin D for a couple of weeks.     BP doing well on her current medical regimen.    Recent surgery and physical therapy.  Recovery has been good for the most part.  Considering doing the other hip soon.     Sees Dr Derek Buck for GYN- he will take care of her mammograms.  She will see him next month.  Last mammogram was done in 2018.     [facility] neph[facility], recently (before surgery).       DEXA reviewed- from fall 2018.  She is on meds.     Some urinary frequency and urgency; she follows with Urology as well, last seen September 2019.  Medications are being adjusted.       Diverticulosis without diverticulitis on CT 4/17.    No symptoms.     Calcified uterine fibroid on CT 4/17.    Patient Active Problem List:     Essential hypertension     Other osteoporosis without current pathological fracture. see DEXA 10/18     Mixed hyperlipidemia     Osteoarthritis of both hips     CKD (chronic kidney disease) stage 3, GFR 30-59 ml/min     Diverticulosis of large intestine without hemorrhage: seen on CT scan April 2017     Intramural leiomyoma of uterus: see CT scan April 2017     Vitamin D deficiency disease     Mild intermittent asthma without complication     Stress incontinence in female     Secondary hyperparathyroidism of renal origin     Primary localized osteoarthrosis of left hip     Nocturnal enuresis      Review of Systems   Constitutional: Negative for activity change, appetite change, chills, fatigue and fever.   HENT: Negative for congestion, hearing loss, sinus pressure and sore throat.    Eyes: Negative for visual disturbance.   Respiratory: Negative for apnea, cough, shortness of breath and wheezing.    Cardiovascular: Negative for chest pain, palpitations and leg swelling. "   Gastrointestinal: Negative for abdominal distention, abdominal pain, constipation, diarrhea, nausea and vomiting.   Genitourinary: Positive for frequency. Negative for dysuria, hematuria and vaginal bleeding.        Incontinence   Musculoskeletal: Positive for arthralgias. Negative for gait problem, joint swelling and myalgias.   Skin: Negative for rash.        Slight amount of yeast intertrigo in the groin area   Neurological: Negative for dizziness, weakness, light-headedness and headaches.   Hematological: Negative for adenopathy. Does not bruise/bleed easily.   Psychiatric/Behavioral: Negative for confusion, hallucinations, sleep disturbance and suicidal ideas.       Objective:      Physical Exam   Constitutional: She is oriented to person, place, and time. She appears well-developed and well-nourished.   HENT:   Head: Normocephalic and atraumatic.   Right Ear: External ear normal.   Left Ear: External ear normal.   Nose: Nose normal.   Mouth/Throat: Oropharynx is clear and moist. No oropharyngeal exudate.   Eyes: Conjunctivae and EOM are normal. No scleral icterus.   Neck: Normal range of motion. Neck supple. No JVD present. No thyromegaly present.   Cardiovascular: Normal rate, regular rhythm, normal heart sounds and intact distal pulses. Exam reveals no gallop.   No murmur heard.  Pulmonary/Chest: Effort normal and breath sounds normal. No respiratory distress. She has no wheezes.   Abdominal: Soft. Bowel sounds are normal. She exhibits no distension and no mass. There is no tenderness. There is no rebound and no guarding.   Musculoskeletal: Normal range of motion. She exhibits no edema or tenderness.   Lymphadenopathy:     She has no cervical adenopathy.   Neurological: She is alert and oriented to person, place, and time. She displays normal reflexes. No cranial nerve deficit. Coordination normal.   Skin: Skin is warm. No rash noted. No erythema.   Minimal yeast intertrigo groin   Psychiatric: She has a  normal mood and affect. Her behavior is normal. Judgment and thought content normal.   Nursing note and vitals reviewed.      Assessment:       1. Essential hypertension    2. Mixed hyperlipidemia    3. CKD (chronic kidney disease) stage 3, GFR 30-59 ml/min    4. Secondary hyperparathyroidism of renal origin    5. Other osteoporosis without current pathological fracture. see DEXA 10/18    6. Primary localized osteoarthrosis of left hip    7. Candidal skin infection        Plan:         Moriah was seen today for follow-up.    Diagnoses and all orders for this visit:    Essential hypertension; continue regimen    Mixed hyperlipidemia; continue regimen; labs and reviewed    CKD (chronic kidney disease) stage 3, GFR 30-59 ml/min; hydration, avoid nephrotoxins.  Has seen her nephrologist recently.  Labs and review    Secondary hyperparathyroidism of renal origin; labs and review    Other osteoporosis without current pathological fracture. see DEXA 10/18; continue regimen; anticipate recheck in the next year    Primary localized osteoarthrosis of left hip:  Keep Ortho follow-up    Candidal skin infection:  OTC antifungal cream and powder, hygienic measures reviewed; follow-up will results    Other orders  -     valsartan (DIOVAN) 320 MG tablet; Take 1 tablet (320 mg total) by mouth once daily.  -     pravastatin (PRAVACHOL) 20 MG tablet; Take 1 tablet (20 mg total) by mouth once daily.  -     amLODIPine (NORVASC) 10 MG tablet; Take 1 tablet (10 mg total) by mouth once daily.  -     alendronate (FOSAMAX) 70 MG tablet; Take 1 tablet (70 mg total) by mouth every 7 days.    Flu shot and shingles vaccine today  She gets mammograms through her gynecologist and scheduled to see him soon; I have asked her to send me those records.    I will review all studies and determine further tx depending on findings

## 2019-11-03 ENCOUNTER — TELEPHONE (OUTPATIENT)
Dept: INTERNAL MEDICINE | Facility: CLINIC | Age: 69
End: 2019-11-03

## 2019-11-03 DIAGNOSIS — E55.9 VITAMIN D DEFICIENCY DISEASE: Primary | ICD-10-CM

## 2019-11-03 RX ORDER — VIT C/E/ZN/COPPR/LUTEIN/ZEAXAN 250MG-90MG
2000 CAPSULE ORAL DAILY
Qty: 60 CAPSULE | Refills: 12 | Status: SHIPPED | OUTPATIENT
Start: 2019-11-03

## 2019-11-03 RX ORDER — ERGOCALCIFEROL 1.25 MG/1
CAPSULE ORAL
Qty: 12 CAPSULE | Refills: 3 | Status: SHIPPED | OUTPATIENT
Start: 2019-11-03 | End: 2020-10-16

## 2019-11-03 NOTE — TELEPHONE ENCOUNTER
Stable labs    Patient has low vitamin D.  I sent Vitamin D 91463 IU 1 x weekly for 20 weeks.  Also should also take vitamin D 2000 IU daily, recheck in 4 months (orders in) thanks

## 2019-11-21 ENCOUNTER — TELEPHONE (OUTPATIENT)
Dept: INTERNAL MEDICINE | Facility: CLINIC | Age: 69
End: 2019-11-21

## 2019-11-21 NOTE — TELEPHONE ENCOUNTER
----- Message from Teresa Herbert sent at 11/21/2019  3:33 PM CST -----  Contact: patient  Patient called to speak with a nurse concerning her medication. Says she needs to verify the medication she received.     She would like a callback at [phone]    Thanks  KB

## 2019-11-22 NOTE — TELEPHONE ENCOUNTER
Spoke to pt---pt stated that she found her notes that she was suppose to take 2000 units of vitamin D daily.

## 2019-11-25 ENCOUNTER — TELEPHONE (OUTPATIENT)
Dept: INTERNAL MEDICINE | Facility: CLINIC | Age: 69
End: 2019-11-25

## 2019-11-25 NOTE — TELEPHONE ENCOUNTER
----- Message from Chantelle Yamilabrayden sent at 11/25/2019  3:00 PM CST -----  Contact: Patient [phone]  Patient wants to know if her lab results can be fax over to [facility] in Nephrology. [phone]. Patient has appt on tomorrow. Please fax asap.     Please call and advise.    Thank You

## 2019-12-01 RX ORDER — PRAVASTATIN SODIUM 20 MG/1
TABLET ORAL
Qty: 90 TABLET | Refills: 4 | Status: SHIPPED | OUTPATIENT
Start: 2019-12-01 | End: 2021-04-09 | Stop reason: SDUPTHER

## 2019-12-18 ENCOUNTER — TELEPHONE (OUTPATIENT)
Dept: UROLOGY | Facility: CLINIC | Age: 69
End: 2019-12-18

## 2019-12-18 NOTE — TELEPHONE ENCOUNTER
Pt states she tried Darifenacin 7.5 mg tabs back in Sept - Oct 2019, with no improvement. States she was told medication can be prescribed in higher dosage. Advised pt to try (2) 7.5mg tabs and let us know how it works so that Dr. Jane may send a new script for 15 mg or change medication all together. Pt expressed understanding.

## 2019-12-18 NOTE — TELEPHONE ENCOUNTER
----- Message from Collin Wright sent at 12/18/2019  3:25 PM CST -----  Contact: Self  Pt calling in regards to meds the provider sent over does not work for her  And she needs a higher dose     Pt states she have sent over 2 messages and yet no response from anyone    ==darifenacin (ENABLEX) 7.5 MG Tb24    Pt uses [facility]/pharmacy #[id number] - [address] [place]    Please advise pt can be reached at [phone]

## 2019-12-27 DIAGNOSIS — N39.41 URGE INCONTINENCE: ICD-10-CM

## 2020-01-02 RX ORDER — DARIFENACIN 7.5 MG/1
7.5 TABLET, EXTENDED RELEASE ORAL DAILY
Qty: 30 TABLET | Refills: 9 | Status: SHIPPED | OUTPATIENT
Start: 2020-01-02 | End: 2020-01-22 | Stop reason: DRUGHIGH

## 2020-01-22 ENCOUNTER — TELEPHONE (OUTPATIENT)
Dept: UROLOGY | Facility: CLINIC | Age: 70
End: 2020-01-22

## 2020-01-22 DIAGNOSIS — N39.41 URGE INCONTINENCE: Primary | ICD-10-CM

## 2020-01-22 DIAGNOSIS — N39.44 NOCTURNAL ENURESIS: ICD-10-CM

## 2020-01-22 DIAGNOSIS — N39.41 URGE INCONTINENCE: ICD-10-CM

## 2020-01-22 RX ORDER — DARIFENACIN 15 MG/1
15 TABLET, EXTENDED RELEASE ORAL DAILY
Qty: 30 TABLET | Refills: 8 | OUTPATIENT
Start: 2020-01-22

## 2020-01-22 RX ORDER — DARIFENACIN 15 MG/1
15 TABLET, EXTENDED RELEASE ORAL DAILY
Qty: 30 TABLET | Refills: 8 | Status: SHIPPED | OUTPATIENT
Start: 2020-01-22 | End: 2020-11-09

## 2020-01-22 NOTE — TELEPHONE ENCOUNTER
Patient asked for the increased darifenacin dose.  Was sent to her [facility] pharmacy 15 mg.

## 2020-02-07 ENCOUNTER — TELEPHONE (OUTPATIENT)
Dept: INTERNAL MEDICINE | Facility: CLINIC | Age: 70
End: 2020-02-07

## 2020-02-07 NOTE — TELEPHONE ENCOUNTER
----- Message from Amanda Morse sent at 2/7/2020 12:02 PM CST -----  Contact: self [phone]  Patient is calling to ask, if she needs to come to be seen before she has a surgery in late March that she's thinking about having.  please advise and call her back.    thanks

## 2020-02-07 NOTE — TELEPHONE ENCOUNTER
Spoke to pt---she is considering having a right total hip replacement in March. She would like to know if she need a visit to be cleared for surgery. She advised that she was cleared by Nephrology. (She had the left done in June). Please advised.

## 2020-02-18 ENCOUNTER — TELEPHONE (OUTPATIENT)
Dept: INTERNAL MEDICINE | Facility: CLINIC | Age: 70
End: 2020-02-18

## 2020-02-18 NOTE — TELEPHONE ENCOUNTER
----- Message from Maryam Magallanes sent at 2/18/2020  3:14 PM CST -----  Needs Advice    Reason for call:--Hip surgery--        Communication Preference:--[place]--[phone]--    Additional Information:Mrs Major calling to speak with the nurse to see when would she need do a pre op before her hip surgery? Please call to advise.

## 2020-02-28 ENCOUNTER — PATIENT OUTREACH (OUTPATIENT)
Dept: ADMINISTRATIVE | Facility: HOSPITAL | Age: 70
End: 2020-02-28

## 2020-02-28 NOTE — PROGRESS NOTES
Chart review completed. Mammo results found in Care Everywhere, linked to health maintenance. External colonoscopy record linked to health maintenance.

## 2020-03-13 ENCOUNTER — IMMUNIZATION (OUTPATIENT)
Dept: PHARMACY | Facility: CLINIC | Age: 70
End: 2020-03-13
Payer: MEDICARE

## 2020-03-13 ENCOUNTER — HOSPITAL ENCOUNTER (OUTPATIENT)
Dept: RADIOLOGY | Facility: HOSPITAL | Age: 70
Discharge: HOME | End: 2020-03-13
Attending: INTERNAL MEDICINE
Payer: MEDICARE

## 2020-03-13 ENCOUNTER — OFFICE VISIT (OUTPATIENT)
Dept: INTERNAL MEDICINE | Facility: CLINIC | Age: 70
End: 2020-03-13
Payer: MEDICARE

## 2020-03-13 VITALS
WEIGHT: 176.38 LBS | BODY MASS INDEX: 26.05 KG/M2 | DIASTOLIC BLOOD PRESSURE: 80 MMHG | SYSTOLIC BLOOD PRESSURE: 125 MMHG

## 2020-03-13 DIAGNOSIS — I10 ESSENTIAL HYPERTENSION: ICD-10-CM

## 2020-03-13 DIAGNOSIS — R60.9 EDEMA, UNSPECIFIED TYPE: ICD-10-CM

## 2020-03-13 DIAGNOSIS — Z01.810 PREOP CARDIOVASCULAR EXAM: Primary | ICD-10-CM

## 2020-03-13 DIAGNOSIS — N18.30 CKD (CHRONIC KIDNEY DISEASE) STAGE 3, GFR 30-59 ML/MIN: ICD-10-CM

## 2020-03-13 DIAGNOSIS — M16.0 OSTEOARTHRITIS OF BOTH HIPS, UNSPECIFIED OSTEOARTHRITIS TYPE: ICD-10-CM

## 2020-03-13 PROCEDURE — 99215 PR OFFICE/OUTPT VISIT, EST, LEVL V, 40-54 MIN: ICD-10-PCS | Mod: S$PBB,,, | Performed by: INTERNAL MEDICINE

## 2020-03-13 PROCEDURE — 93970 US LOWER EXTREMITY VEINS BILATERAL: ICD-10-PCS | Mod: 26,,, | Performed by: RADIOLOGY

## 2020-03-13 PROCEDURE — 99213 OFFICE O/P EST LOW 20 MIN: CPT | Mod: PBBFAC,25 | Performed by: INTERNAL MEDICINE

## 2020-03-13 PROCEDURE — 99215 OFFICE O/P EST HI 40 MIN: CPT | Mod: S$PBB,,, | Performed by: INTERNAL MEDICINE

## 2020-03-13 PROCEDURE — 99999 PR PBB SHADOW E&M-EST. PATIENT-LVL III: CPT | Mod: PBBFAC,,, | Performed by: INTERNAL MEDICINE

## 2020-03-13 PROCEDURE — 99999 PR PBB SHADOW E&M-EST. PATIENT-LVL III: ICD-10-PCS | Mod: PBBFAC,,, | Performed by: INTERNAL MEDICINE

## 2020-03-13 PROCEDURE — 93970 EXTREMITY STUDY: CPT | Mod: 26,,, | Performed by: RADIOLOGY

## 2020-03-13 PROCEDURE — 93970 EXTREMITY STUDY: CPT | Mod: TC

## 2020-03-13 NOTE — PATIENT INSTRUCTIONS
Prevention Guidelines, Women Ages 65 and Older  Screening tests and vaccines are an important part of managing your health. Health counseling is essential, too. Below are guidelines for these, for women ages 65 and older. Talk with your healthcare provider to make sure youre up to date on what you need.  Screening Who needs it How often   Type 2 diabetes or prediabetes All adults beginning at age 45 and adults without symptoms at any age who are overweight or obese and have 1 or more additional risk factors for diabetes At least every 3 years   Alcohol misuse All women in this age group At routine exams   Blood pressure All women in this age group Every 2 years if your blood pressure is less than 120/80 mm Hg; yearly if your systolic blood pressure is 120 to 139 mm Hg, or your diastolic blood pressure reading is 80 to 89 mm Hg   Breast cancer All women in this age group Yearly mammogram and clinical breast exam1   Cervical cancer Only women who had abnormal screening results before age 65 Talk with your healthcare provider   Chlamydia Women at increased risk for infection At routine exams   Colorectal cancer All women in this age group1 Flexible sigmoidoscopy every 5 years, or colonoscopy every 10 years, or double-contrast barium enema every 5 years; yearly fecal occult blood test or fecal immunochemical test; or a stool DNA test as often as your healthcare provider advises; talk with your healthcare provider about which tests are best for you   Depression All women in this age group At routine exams   Gonorrhea Sexually active women at increased risk for infection At routine exams   Hepatitis C Anyone at increased risk; 1 time for those born between 1945 and 1965 At routine exams   High cholesterol or triglycerides All women in this age group who are at risk for coronary artery disease At least every 5 years   HIV Women at increased risk for infection - talk with your healthcare provider At routine exams   Lung  cancer Adults age 55 to 80 who have smoked Yearly screening in smokers with 30 pack-year history of smoking or who quit within 15 years   Obesity All women in this age group At routine exams   Osteoporosis All women in this age group Bone density test at age 65, then follow-up as advised by your healthcare provider   Syphilis Women at increased risk for infection - talk with your healthcare provider At routine exams   Thyroid-Stimulating Hormone (TSH) All women in this age group Every 5 years   Tuberculosis Women at increased risk for infection - talk with your healthcare provider Ask your healthcare provider   Vision All women in this age group Every 1 to 2 years; if you have a chronic health condition, ask your healthcare provider if you need exams more often   Vaccine Who needs it How often   Chickenpox (varicella) All women in this age group who have no record of this infection or vaccine 2 doses; second dose should be given at least 4 weeks after the first dose   Hepatitis A Women at increased risk for infection - talk with your healthcare provider 2 doses given 6 months apart   Hepatitis B Women at increased risk for infection - talk with your healthcare provider 3 doses over 6 months; second dose should be given 1 month after the first dose; the third dose should be given at least 2 months after the second dose and at least 4 months after the first dose   Haemophilus influenza Type B (HIB) Women at increased risk for infection - talk with your healthcare provider 1 to 3 doses   Influenza (flu) All women in this age group Once a year   Pneumococcal conjugate vaccine (PCV13) and pneumococcal polysaccharide vaccine (PPSV23) All women in this age group 1 dose of each vaccine   Tetanus/diphtheria/pertussis (Td/Tdap) booster All women in this age group Td every 10 years, or a one-time dose of Tdap instead of a Td booster after age 18, then Td every 10 years   Zoster All women in this age group 1 dose   Counseling  Who needs it How often   Diet and exercise Women who are overweight or obese When diagnosed, and then at routine exams   Fall prevention (exercise and vitamin D supplements) All women in this age group At routine exams   Sexually transmitted infection prevention Women at increased risk for infection - talk with your healthcare provider At routine exams   Use of daily aspirin Women ages 55 and up in this age group who are at risk for cardiovascular health problems such as stroke When your risk is known   Use of tobacco and the health effects it can cause All women in this age group Every exam   1[employer]  Date Last Reviewed: 8/9/2015  © 7080-4163 [employer]. [address] [place] [address], [place] [address]. All rights reserved. This information is not intended as a substitute for professional medical care. Always follow your healthcare professional's instructions.

## 2020-03-13 NOTE — PROGRESS NOTES
CHIEF COMPLAINT:  The patient is here for preoperative medical optimization prior to surgery.    SURGICAL PROCEDURE: R THR    SURGEON: Alavrez Etienne    HISTORY OF PRESENT ILLNESS:  The reason for the visit today as a preoperative consultation prior to surgery.    The patient does not have any active cardiac conditions (does NOT have unstable coronary artery disease, decompensated heart failure, arrhythmia or severe valvular heart disease).    The patient has a limited exercise capacity due to orthopedic issues.    The patient has no clinical risk factors of prior heart failure, stroke, TIA, renal or hepatic insufficiency.  The patient has no respiratory illness.  Chronic medical issues have been stable.    BP doing well.      Recall surgery last June went well; had physical therapy.  Recovery has been good.     Sees Dr Derek Buck for GYN- he will take care of her mammograms.  She will see him next month.  Last mammogram was done in 2018.     [facility] neph[facility], recently (before surgery).       DEXA reviewed- from fall 2018.  She is on meds.     Some urinary frequency and urgency; she follows with Urology as well, last seen September 2019.  Medications are being adjusted. Sees Dr. Jane.    PAST MEDICAL HISTORY:  Patient Active Problem List   Diagnosis    Essential hypertension    Other osteoporosis without current pathological fracture. see DEXA 10/18    Mixed hyperlipidemia    Osteoarthritis of both hips    CKD (chronic kidney disease) stage 3, GFR 30-59 ml/min    Diverticulosis of large intestine without hemorrhage: seen on CT scan April 2017    Intramural leiomyoma of uterus: see CT scan April 2017    Vitamin D deficiency disease    Mild intermittent asthma without complication    Stress incontinence in female    Secondary hyperparathyroidism of renal origin    Primary localized osteoarthrosis of left hip    Nocturnal enuresis       SOCIAL HISTORY:  Former smoker, fewer than 10 pack  years.  No alcohol.    FAMILY HISTORY:  Family History   Problem Relation Age of Onset    Heart disease Mother         MI    Diabetes Father     Hypertension Father     Arthritis Sister     Meningitis Brother     Stroke Maternal Aunt     Stroke Maternal Grandmother        MEDS AND ALLERGIES: Reviewed/reconciled as per MEDCARD and ALLERGY CARD.    REVIEW OF SYSTEMS:  GENERAL:  HEENT: No blurred vision, headache or ear pain.  CV: No chest pain, pressure, tightness or shortness of breath.  Respiratory: No cough or wheezing.  Gastrointestinal: No nausea, vomiting or diarrhea  Genitourinary: No dysuria or hematuria  Psych:  Denies depression, SI or HI.  No thought disorder.    PE:  HEENT: oropharynx clear.  TMs clear.  Neck: No JVD or bruits  Lungs: Clear to auscultation bilaterally  CV: Regular rate and rhythm without murmurs  Abdomen: Soft and nontender with no masses  Extremities: No edema, clubbing or cyanosis  Psych: Alert and oriented x3, no SI or HI.    DATA: Labs acceptable    Moriah was seen today for pre-op exam.    Diagnoses and all orders for this visit:    Preop cardiovascular exam    Osteoarthritis of both hips, unspecified osteoarthritis type    Essential hypertension    CKD (chronic kidney disease) stage 3, GFR 30-59 ml/min    Edema, unspecified type  -     US Lower Extremity Veins Bilateral; Future: negative for DVT         PLAN:  1. Continue current medications, MEDCARD reconciled with the patient  2. Discontinue NSAIDs and aspirin 5-7 days prior to procedure    PREOPERATIVE RISK ASSESSMENT AND RECOMMENDATIONS:  The patient presents for medical evaluation.  He is undergoing noncardiac surgery which carries a low risk of cardiac complications.    The patient has stable cardiac conditions.  Exercise capacity is fair.  She has stable risk factors for surgery.  She would be considered average risk for surgery.    CLEARANCE:  The patient is medically optimized for surgery.  No further testing or  intervention is needed.

## 2020-06-04 ENCOUNTER — IMMUNIZATION (OUTPATIENT)
Dept: PHARMACY | Facility: CLINIC | Age: 70
End: 2020-06-04
Payer: MEDICARE

## 2020-06-25 NOTE — TELEPHONE ENCOUNTER
----- Message from Nerissa Falk sent at 1/22/2020  9:03 AM CST -----  Contact: [phone]  Calling to get new prescription for Rx darifenacin (ENABLEX) 7.5 MG Tb24, directions should be 2 tabs once a day or 15 mg once a day per nurse. Requested initially 12/18/2019 and again on 12/27/2019. Please advise.      [facility]/pharmacy #[id number] - [place] [address] [address]  Phone: [phone] Fax: [phone]         Prednisone Counseling:  I discussed with the patient the risks of prolonged use of prednisone including but not limited to weight gain, insomnia, osteoporosis, mood changes, diabetes, susceptibility to infection, glaucoma and high blood pressure.  In cases where prednisone use is prolonged, patients should be monitored with blood pressure checks, serum glucose levels and an eye exam.  Additionally, the patient may need to be placed on GI prophylaxis, PCP prophylaxis, and calcium and vitamin D supplementation and/or a bisphosphonate.  The patient verbalized understanding of the proper use and the possible adverse effects of prednisone.  All of the patient's questions and concerns were addressed.

## 2020-11-18 NOTE — TELEPHONE ENCOUNTER
----- Message from Tash Plaza sent at 11/18/2020  3:54 PM CST -----  Contact: [phone] @ Patient  Requesting an RX refill or new RX.  Is this a refill or new RX:   RX name and strength:valsartan (DIOVAN) 320 MG tablet      Is this a 30 day or 90 day RX:   Pharmacy name and phone # [id number] - [address]  Comments:

## 2020-11-19 RX ORDER — VALSARTAN 320 MG/1
320 TABLET ORAL DAILY
Qty: 30 TABLET | Refills: 12 | Status: SHIPPED | OUTPATIENT
Start: 2020-11-19 | End: 2022-01-07

## 2021-03-08 ENCOUNTER — TELEPHONE (OUTPATIENT)
Dept: INTERNAL MEDICINE | Facility: CLINIC | Age: 71
End: 2021-03-08

## 2021-03-08 ENCOUNTER — OFFICE VISIT (OUTPATIENT)
Dept: INTERNAL MEDICINE | Facility: CLINIC | Age: 71
End: 2021-03-08
Payer: MEDICARE

## 2021-03-08 VITALS
WEIGHT: 211 LBS | DIASTOLIC BLOOD PRESSURE: 65 MMHG | BODY MASS INDEX: 29.54 KG/M2 | HEIGHT: 71 IN | SYSTOLIC BLOOD PRESSURE: 125 MMHG

## 2021-03-08 DIAGNOSIS — N25.81 SECONDARY HYPERPARATHYROIDISM OF RENAL ORIGIN: ICD-10-CM

## 2021-03-08 DIAGNOSIS — N39.3 STRESS INCONTINENCE IN FEMALE: ICD-10-CM

## 2021-03-08 DIAGNOSIS — E78.2 MIXED HYPERLIPIDEMIA: ICD-10-CM

## 2021-03-08 DIAGNOSIS — R25.1 TREMOR: ICD-10-CM

## 2021-03-08 DIAGNOSIS — K64.9 HEMORRHOIDS, UNSPECIFIED HEMORRHOID TYPE: ICD-10-CM

## 2021-03-08 DIAGNOSIS — N18.4 CHRONIC RENAL FAILURE, STAGE 4 (SEVERE): Primary | ICD-10-CM

## 2021-03-08 DIAGNOSIS — R73.01 IFG (IMPAIRED FASTING GLUCOSE): ICD-10-CM

## 2021-03-08 DIAGNOSIS — I10 ESSENTIAL HYPERTENSION: ICD-10-CM

## 2021-03-08 DIAGNOSIS — R53.83 FATIGUE, UNSPECIFIED TYPE: ICD-10-CM

## 2021-03-08 DIAGNOSIS — E55.9 VITAMIN D DEFICIENCY DISEASE: ICD-10-CM

## 2021-03-08 PROBLEM — M81.0 AGE-RELATED OSTEOPOROSIS WITHOUT CURRENT PATHOLOGICAL FRACTURE: Status: ACTIVE | Noted: 2020-11-25

## 2021-03-08 PROBLEM — N18.30 CKD (CHRONIC KIDNEY DISEASE) STAGE 3, GFR 30-59 ML/MIN: Status: RESOLVED | Noted: 2018-06-25 | Resolved: 2021-03-08

## 2021-03-08 PROBLEM — M16.12 PRIMARY LOCALIZED OSTEOARTHROSIS OF LEFT HIP: Status: RESOLVED | Noted: 2019-05-29 | Resolved: 2021-03-08

## 2021-03-08 PROCEDURE — 99214 OFFICE O/P EST MOD 30 MIN: CPT | Mod: PBBFAC | Performed by: INTERNAL MEDICINE

## 2021-03-08 PROCEDURE — 99999 PR PBB SHADOW E&M-EST. PATIENT-LVL IV: CPT | Mod: PBBFAC,,, | Performed by: INTERNAL MEDICINE

## 2021-03-08 PROCEDURE — 99999 PR PBB SHADOW E&M-EST. PATIENT-LVL IV: ICD-10-PCS | Mod: PBBFAC,,, | Performed by: INTERNAL MEDICINE

## 2021-03-08 PROCEDURE — 99214 OFFICE O/P EST MOD 30 MIN: CPT | Mod: S$PBB,,, | Performed by: INTERNAL MEDICINE

## 2021-03-08 PROCEDURE — 99214 PR OFFICE/OUTPT VISIT, EST, LEVL IV, 30-39 MIN: ICD-10-PCS | Mod: S$PBB,,, | Performed by: INTERNAL MEDICINE

## 2021-03-08 RX ORDER — ASPIRIN 325 MG
325 TABLET, DELAYED RELEASE (ENTERIC COATED) ORAL
COMMUNITY
Start: 2020-06-24 | End: 2021-03-08

## 2021-03-08 RX ORDER — HYDROCORTISONE ACETATE 25 MG/1
25 SUPPOSITORY RECTAL 2 TIMES DAILY PRN
Qty: 20 SUPPOSITORY | Refills: 0 | Status: SHIPPED | OUTPATIENT
Start: 2021-03-08 | End: 2021-03-18

## 2021-03-08 RX ORDER — DENOSUMAB 60 MG/ML
60 INJECTION SUBCUTANEOUS
COMMUNITY
End: 2021-12-08

## 2021-03-10 ENCOUNTER — PATIENT OUTREACH (OUTPATIENT)
Dept: ADMINISTRATIVE | Facility: HOSPITAL | Age: 71
End: 2021-03-10

## 2021-04-02 ENCOUNTER — NURSE TRIAGE (OUTPATIENT)
Dept: ADMINISTRATIVE | Facility: CLINIC | Age: 71
End: 2021-04-02

## 2021-04-09 RX ORDER — PRAVASTATIN SODIUM 20 MG/1
20 TABLET ORAL DAILY
Qty: 90 TABLET | Refills: 4 | Status: SHIPPED | OUTPATIENT
Start: 2021-04-09 | End: 2022-04-25

## 2021-05-06 ENCOUNTER — NUTRITION (OUTPATIENT)
Dept: NUTRITION | Facility: CLINIC | Age: 71
End: 2021-05-06
Payer: MEDICARE

## 2021-05-06 VITALS — WEIGHT: 214.5 LBS | HEIGHT: 71 IN | BODY MASS INDEX: 30.03 KG/M2

## 2021-05-06 DIAGNOSIS — Z71.3 DIETARY COUNSELING: ICD-10-CM

## 2021-05-06 DIAGNOSIS — I10 ESSENTIAL HYPERTENSION: Primary | ICD-10-CM

## 2021-05-06 DIAGNOSIS — N18.4 CHRONIC RENAL FAILURE, STAGE 4 (SEVERE): ICD-10-CM

## 2021-05-06 PROCEDURE — 99999 PR PBB SHADOW E&M-EST. PATIENT-LVL III: ICD-10-PCS | Mod: PBBFAC,,,

## 2021-05-06 PROCEDURE — 97802 MEDICAL NUTRITION INDIV IN: CPT | Mod: PBBFAC,59 | Performed by: DIETITIAN, REGISTERED

## 2021-05-06 PROCEDURE — 99999 PR PBB SHADOW E&M-EST. PATIENT-LVL III: CPT | Mod: PBBFAC,,,

## 2021-05-06 PROCEDURE — 99213 OFFICE O/P EST LOW 20 MIN: CPT | Mod: PBBFAC

## 2021-09-30 ENCOUNTER — TELEPHONE (OUTPATIENT)
Dept: INTERNAL MEDICINE | Facility: CLINIC | Age: 71
End: 2021-09-30

## 2021-09-30 DIAGNOSIS — Z12.31 SCREENING MAMMOGRAM, ENCOUNTER FOR: ICD-10-CM

## 2021-09-30 DIAGNOSIS — F51.11 PRIMARY HYPERSOMNIA: ICD-10-CM

## 2021-09-30 DIAGNOSIS — R06.83 SNORING: Primary | ICD-10-CM

## 2021-09-30 DIAGNOSIS — M81.0 AGE-RELATED OSTEOPOROSIS WITHOUT CURRENT PATHOLOGICAL FRACTURE: ICD-10-CM

## 2021-09-30 PROBLEM — I12.9 HYPERTENSIVE NEPHROSCLEROSIS: Status: ACTIVE | Noted: 2021-03-29

## 2021-09-30 RX ORDER — ALENDRONATE SODIUM 70 MG/1
70 TABLET ORAL
COMMUNITY
Start: 2021-04-06

## 2021-09-30 RX ORDER — HYDROCODONE BITARTRATE AND ACETAMINOPHEN 5; 325 MG/1; MG/1
1 TABLET ORAL EVERY 6 HOURS PRN
COMMUNITY
Start: 2021-09-17 | End: 2024-02-06

## 2021-09-30 RX ORDER — CARVEDILOL 3.12 MG/1
3.12 TABLET ORAL
COMMUNITY
End: 2024-02-06 | Stop reason: SDUPTHER

## 2021-09-30 RX ORDER — OMEPRAZOLE 40 MG/1
40 CAPSULE, DELAYED RELEASE ORAL DAILY
COMMUNITY
Start: 2021-08-01 | End: 2024-02-06

## 2021-09-30 RX ORDER — ONDANSETRON 8 MG/1
8 TABLET, ORALLY DISINTEGRATING ORAL EVERY 8 HOURS PRN
COMMUNITY
Start: 2021-09-17 | End: 2024-02-06

## 2021-10-05 ENCOUNTER — TELEPHONE (OUTPATIENT)
Dept: SLEEP MEDICINE | Facility: OTHER | Age: 71
End: 2021-10-05

## 2021-10-08 ENCOUNTER — TELEPHONE (OUTPATIENT)
Dept: SLEEP MEDICINE | Facility: OTHER | Age: 71
End: 2021-10-08

## 2021-10-18 ENCOUNTER — TELEPHONE (OUTPATIENT)
Dept: SLEEP MEDICINE | Facility: OTHER | Age: 71
End: 2021-10-18

## 2021-10-19 ENCOUNTER — HOSPITAL ENCOUNTER (OUTPATIENT)
Dept: SLEEP MEDICINE | Facility: OTHER | Age: 71
Discharge: HOME | End: 2021-10-19
Attending: INTERNAL MEDICINE
Payer: MEDICARE

## 2021-10-19 DIAGNOSIS — R06.83 SNORING: ICD-10-CM

## 2021-10-19 DIAGNOSIS — F51.11 PRIMARY HYPERSOMNIA: ICD-10-CM

## 2021-10-19 DIAGNOSIS — G47.33 OSA (OBSTRUCTIVE SLEEP APNEA): Primary | ICD-10-CM

## 2021-10-19 PROCEDURE — 95800 PR SLEEP STUDY, UNATTENDED, RECORD HEART RATE/O2 SAT/RESP ANAL/SLEEP TIME: ICD-10-PCS | Mod: 26,,, | Performed by: INTERNAL MEDICINE

## 2021-10-19 PROCEDURE — 95800 SLP STDY UNATTENDED: CPT

## 2021-10-19 PROCEDURE — 95800 SLP STDY UNATTENDED: CPT | Mod: 26,,, | Performed by: INTERNAL MEDICINE

## 2021-10-28 ENCOUNTER — TELEPHONE (OUTPATIENT)
Dept: INTERNAL MEDICINE | Facility: CLINIC | Age: 71
End: 2021-10-28
Payer: MEDICARE

## 2021-10-28 DIAGNOSIS — G47.30 SLEEP APNEA, UNSPECIFIED TYPE: Primary | ICD-10-CM

## 2021-11-09 ENCOUNTER — TELEPHONE (OUTPATIENT)
Dept: INTERNAL MEDICINE | Facility: CLINIC | Age: 71
End: 2021-11-09
Payer: MEDICARE

## 2021-11-15 ENCOUNTER — TELEPHONE (OUTPATIENT)
Dept: INTERNAL MEDICINE | Facility: CLINIC | Age: 71
End: 2021-11-15
Payer: MEDICARE

## 2021-12-08 ENCOUNTER — OFFICE VISIT (OUTPATIENT)
Dept: INTERNAL MEDICINE | Facility: CLINIC | Age: 71
End: 2021-12-08
Payer: MEDICARE

## 2021-12-08 VITALS
WEIGHT: 216 LBS | HEIGHT: 71 IN | DIASTOLIC BLOOD PRESSURE: 65 MMHG | BODY MASS INDEX: 30.24 KG/M2 | SYSTOLIC BLOOD PRESSURE: 125 MMHG

## 2021-12-08 DIAGNOSIS — D25.1 INTRAMURAL LEIOMYOMA OF UTERUS: ICD-10-CM

## 2021-12-08 DIAGNOSIS — N39.41 URGE INCONTINENCE: ICD-10-CM

## 2021-12-08 DIAGNOSIS — N28.1 RENAL CYST: ICD-10-CM

## 2021-12-08 DIAGNOSIS — I10 ESSENTIAL HYPERTENSION: ICD-10-CM

## 2021-12-08 DIAGNOSIS — N39.3 STRESS INCONTINENCE IN FEMALE: ICD-10-CM

## 2021-12-08 DIAGNOSIS — E78.2 MIXED HYPERLIPIDEMIA: ICD-10-CM

## 2021-12-08 DIAGNOSIS — K57.30 DIVERTICULOSIS OF LARGE INTESTINE WITHOUT HEMORRHAGE: ICD-10-CM

## 2021-12-08 DIAGNOSIS — N25.81 SECONDARY HYPERPARATHYROIDISM OF RENAL ORIGIN: ICD-10-CM

## 2021-12-08 DIAGNOSIS — E66.09 CLASS 1 OBESITY DUE TO EXCESS CALORIES WITH SERIOUS COMORBIDITY AND BODY MASS INDEX (BMI) OF 30.0 TO 30.9 IN ADULT: ICD-10-CM

## 2021-12-08 DIAGNOSIS — M81.0 AGE-RELATED OSTEOPOROSIS WITHOUT CURRENT PATHOLOGICAL FRACTURE: ICD-10-CM

## 2021-12-08 DIAGNOSIS — I12.9 HYPERTENSIVE NEPHROSCLEROSIS, STAGE 1 THROUGH STAGE 4 OR UNSPECIFIED CHRONIC KIDNEY DISEASE: ICD-10-CM

## 2021-12-08 DIAGNOSIS — N20.0 RENAL STONE: ICD-10-CM

## 2021-12-08 DIAGNOSIS — J45.20 MILD INTERMITTENT ASTHMA WITHOUT COMPLICATION: ICD-10-CM

## 2021-12-08 DIAGNOSIS — N18.32 CHRONIC RENAL FAILURE, STAGE 3B: ICD-10-CM

## 2021-12-08 DIAGNOSIS — G47.30 SLEEP APNEA, UNSPECIFIED TYPE: Primary | ICD-10-CM

## 2021-12-08 PROBLEM — M81.8 OTHER OSTEOPOROSIS WITHOUT CURRENT PATHOLOGICAL FRACTURE: Status: RESOLVED | Noted: 2018-06-25 | Resolved: 2021-12-08

## 2021-12-08 PROBLEM — E66.811 CLASS 1 OBESITY DUE TO EXCESS CALORIES WITH SERIOUS COMORBIDITY AND BODY MASS INDEX (BMI) OF 30.0 TO 30.9 IN ADULT: Status: ACTIVE | Noted: 2021-12-08

## 2021-12-08 PROCEDURE — 99999 PR PBB SHADOW E&M-EST. PATIENT-LVL III: ICD-10-PCS | Mod: PBBFAC,,, | Performed by: INTERNAL MEDICINE

## 2021-12-08 PROCEDURE — 99999 PR PBB SHADOW E&M-EST. PATIENT-LVL III: CPT | Mod: PBBFAC,,, | Performed by: INTERNAL MEDICINE

## 2021-12-08 PROCEDURE — 99214 PR OFFICE/OUTPT VISIT, EST, LEVL IV, 30-39 MIN: ICD-10-PCS | Mod: S$PBB,,, | Performed by: INTERNAL MEDICINE

## 2021-12-08 PROCEDURE — 99214 OFFICE O/P EST MOD 30 MIN: CPT | Mod: S$PBB,,, | Performed by: INTERNAL MEDICINE

## 2021-12-08 PROCEDURE — 99213 OFFICE O/P EST LOW 20 MIN: CPT | Mod: PBBFAC | Performed by: INTERNAL MEDICINE

## 2021-12-08 RX ORDER — LATANOPROST 50 UG/ML
SOLUTION/ DROPS OPHTHALMIC
COMMUNITY
Start: 2021-10-14

## 2022-01-07 RX ORDER — VALSARTAN 320 MG/1
320 TABLET ORAL DAILY
Qty: 90 TABLET | Refills: 4 | Status: SHIPPED | OUTPATIENT
Start: 2022-01-07 | End: 2023-02-16

## 2022-01-07 NOTE — TELEPHONE ENCOUNTER
No new care gaps identified.  Powered by [employer]. Reference number: [id number].   1/07/2022 12:28:35 AM CST

## 2022-02-14 ENCOUNTER — TELEPHONE (OUTPATIENT)
Dept: INTERNAL MEDICINE | Facility: CLINIC | Age: 72
End: 2022-02-14
Payer: MEDICARE

## 2022-02-14 NOTE — TELEPHONE ENCOUNTER
----- Message from Joanna Ellsworth sent at 2/14/2022  4:45 PM CST -----  Contact: Moriah [phone]  Patient would like to get medical advice.  Symptoms (please be specific):    How long have you had these symptoms:   Would you like a call back, or a response through your [facility] portal?:  Pharmacy name and phone # (copy from chart):    Comments:  Pt is requesting a call back from the nurse because she is trying to get long term healthcare and she wants to get the correct address and phone number for Dr Herman so that it will be accurate in order for the company to send Dr Herman the information

## 2022-02-15 ENCOUNTER — TELEPHONE (OUTPATIENT)
Dept: INTERNAL MEDICINE | Facility: CLINIC | Age: 72
End: 2022-02-15
Payer: MEDICARE

## 2022-02-15 NOTE — TELEPHONE ENCOUNTER
----- Message from Page Larios MA sent at 2/14/2022  5:39 PM CST -----  Regarding: Patient requesting to speak with nurse  Contact: Moriah [phone]    ----- Message -----  From: Joanna Ellsworth  Sent: 2/14/2022   4:51 PM CST  To: Virgil Chowdhury A Staff    Patient would like to get medical advice.  Symptoms (please be specific):    How long have you had these symptoms:   Would you like a call back, or a response through your [facility] portal?:  Pharmacy name and phone # (copy from chart):    Comments:  Pt is requesting a call back from the nurse because she is trying to get long term healthcare and she wants to get the correct address and phone number for Dr Herman so that it will be accurate in order for the company to send Dr Herman the information

## 2022-05-09 RX ORDER — ERGOCALCIFEROL 1.25 MG/1
CAPSULE ORAL
Qty: 12 CAPSULE | Refills: 3 | Status: SHIPPED | OUTPATIENT
Start: 2022-05-09 | End: 2023-07-05

## 2022-06-10 ENCOUNTER — TELEPHONE (OUTPATIENT)
Dept: INTERNAL MEDICINE | Facility: CLINIC | Age: 72
End: 2022-06-10
Payer: MEDICARE

## 2022-06-10 NOTE — TELEPHONE ENCOUNTER
----- Message from Hawa Leon sent at 6/10/2022 11:28 AM CDT -----  Contact: [phone]  Type:  Needs Medical Advice    Who Called: pt called   Would the patient rather a call back or a response via [facility]? Call back  Best Call Back Number: [phone]  Additional Information: Pt would like to know if she needs to take the 4th shot for COVID. Please call pt to advice

## 2022-08-31 ENCOUNTER — TELEPHONE (OUTPATIENT)
Dept: INTERNAL MEDICINE | Facility: CLINIC | Age: 72
End: 2022-08-31
Payer: MEDICARE

## 2022-08-31 DIAGNOSIS — Z78.0 MENOPAUSE: ICD-10-CM

## 2022-12-04 ENCOUNTER — TELEPHONE (OUTPATIENT)
Dept: INTERNAL MEDICINE | Facility: CLINIC | Age: 72
End: 2022-12-04
Payer: MEDICARE

## 2022-12-04 NOTE — TELEPHONE ENCOUNTER
Please call    She needs an appt to see me in the next 1-2 months    Also needs mammogram and colon testing

## 2022-12-19 ENCOUNTER — HOSPITAL ENCOUNTER (OUTPATIENT)
Dept: RADIOLOGY | Facility: CLINIC | Age: 72
Discharge: HOME | End: 2022-12-19
Attending: INTERNAL MEDICINE
Payer: MEDICARE

## 2022-12-19 DIAGNOSIS — Z78.0 MENOPAUSE: ICD-10-CM

## 2022-12-19 PROCEDURE — 77080 DXA BONE DENSITY AXIAL: CPT | Mod: 26,,, | Performed by: INTERNAL MEDICINE

## 2022-12-19 PROCEDURE — 77080 DXA BONE DENSITY AXIAL: CPT | Mod: TC

## 2022-12-19 PROCEDURE — 77080 DEXA BONE DENSITY SPINE HIP: ICD-10-PCS | Mod: 26,,, | Performed by: INTERNAL MEDICINE

## 2023-01-23 ENCOUNTER — TELEPHONE (OUTPATIENT)
Dept: INTERNAL MEDICINE | Facility: CLINIC | Age: 73
End: 2023-01-23
Payer: MEDICARE

## 2023-01-23 NOTE — TELEPHONE ENCOUNTER
Spoke w/ pt pt stated she had labs done at [email or web address], gave our fax number so lab will be faxed to us.

## 2023-01-23 NOTE — TELEPHONE ENCOUNTER
----- Message from Tash Plaza sent at 1/23/2023  3:56 PM CST -----  Contact: Patient @ [phone]  Good Afternoon  Patient is calling to see if labs she had done at [facility] for other Dr could they be used for her visit with Dr Herman    Please call and advise

## 2023-01-30 ENCOUNTER — PATIENT OUTREACH (OUTPATIENT)
Dept: ADMINISTRATIVE | Facility: HOSPITAL | Age: 73
End: 2023-01-30
Payer: MEDICARE

## 2023-01-30 DIAGNOSIS — Z12.12 ENCOUNTER FOR COLORECTAL CANCER SCREENING: Primary | ICD-10-CM

## 2023-01-30 DIAGNOSIS — Z12.11 ENCOUNTER FOR COLORECTAL CANCER SCREENING: Primary | ICD-10-CM

## 2023-01-30 NOTE — PROGRESS NOTES
Health Maintenance Due   Topic Date Due    Colorectal Cancer Screening  06/09/2022    Lipid Panel  11/04/2022    Mammogram  01/24/2023      Chart reviewed. Triggered LINKS. Updated Care Everywhere. Patient has a order for mammogram placed on 01/20/23 to be done at [facility]. Endo procedure  placed for colorectal screening.     Lux Paris CMA  Population Health Care Coordinator  Primary Care Team

## 2023-02-06 ENCOUNTER — LAB VISIT (OUTPATIENT)
Dept: LAB | Facility: HOSPITAL | Age: 73
End: 2023-02-06
Attending: INTERNAL MEDICINE
Payer: MEDICARE

## 2023-02-06 DIAGNOSIS — R73.01 IFG (IMPAIRED FASTING GLUCOSE): ICD-10-CM

## 2023-02-06 DIAGNOSIS — I10 ESSENTIAL HYPERTENSION: ICD-10-CM

## 2023-02-06 DIAGNOSIS — E78.2 MIXED HYPERLIPIDEMIA: ICD-10-CM

## 2023-02-06 DIAGNOSIS — R53.83 FATIGUE, UNSPECIFIED TYPE: ICD-10-CM

## 2023-02-06 DIAGNOSIS — N25.81 SECONDARY HYPERPARATHYROIDISM OF RENAL ORIGIN: ICD-10-CM

## 2023-02-06 DIAGNOSIS — E55.9 VITAMIN D DEFICIENCY DISEASE: ICD-10-CM

## 2023-02-06 LAB
25(OH)D3+25(OH)D2 SERPL-MCNC: 33 NG/ML (ref 30–96)
ALBUMIN SERPL BCP-MCNC: 3.7 G/DL (ref 3.5–5.2)
ALP SERPL-CCNC: 97 U/L (ref 55–135)
ALT SERPL W/O P-5'-P-CCNC: 8 U/L (ref 10–44)
ANION GAP SERPL CALC-SCNC: 10 MMOL/L (ref 8–16)
AST SERPL-CCNC: 18 U/L (ref 10–40)
BASOPHILS # BLD AUTO: 0.07 K/UL (ref 0–0.2)
BASOPHILS NFR BLD: 1.4 % (ref 0–1.9)
BILIRUB SERPL-MCNC: 0.6 MG/DL (ref 0.1–1)
BUN SERPL-MCNC: 21 MG/DL (ref 8–23)
CALCIUM SERPL-MCNC: 10 MG/DL (ref 8.7–10.5)
CHLORIDE SERPL-SCNC: 104 MMOL/L (ref 95–110)
CHOLEST SERPL-MCNC: 232 MG/DL (ref 120–199)
CHOLEST/HDLC SERPL: 4.1 {RATIO} (ref 2–5)
CO2 SERPL-SCNC: 24 MMOL/L (ref 23–29)
CREAT SERPL-MCNC: 1.1 MG/DL (ref 0.5–1.4)
DIFFERENTIAL METHOD: ABNORMAL
EOSINOPHIL # BLD AUTO: 0.4 K/UL (ref 0–0.5)
EOSINOPHIL NFR BLD: 8.7 % (ref 0–8)
ERYTHROCYTE [DISTWIDTH] IN BLOOD BY AUTOMATED COUNT: 13.6 % (ref 11.5–14.5)
EST. GFR  (NO RACE VARIABLE): 53.1 ML/MIN/1.73 M^2
ESTIMATED AVG GLUCOSE: 117 MG/DL (ref 68–131)
GLUCOSE SERPL-MCNC: 90 MG/DL (ref 70–110)
HBA1C MFR BLD: 5.7 % (ref 4–5.6)
HCT VFR BLD AUTO: 39 % (ref 37–48.5)
HDLC SERPL-MCNC: 57 MG/DL (ref 40–75)
HDLC SERPL: 24.6 % (ref 20–50)
HGB BLD-MCNC: 12.2 G/DL (ref 12–16)
IMM GRANULOCYTES # BLD AUTO: 0.02 K/UL (ref 0–0.04)
IMM GRANULOCYTES NFR BLD AUTO: 0.4 % (ref 0–0.5)
LDLC SERPL CALC-MCNC: 138.2 MG/DL (ref 63–159)
LYMPHOCYTES # BLD AUTO: 1.8 K/UL (ref 1–4.8)
LYMPHOCYTES NFR BLD: 37 % (ref 18–48)
MCH RBC QN AUTO: 30 PG (ref 27–31)
MCHC RBC AUTO-ENTMCNC: 31.3 G/DL (ref 32–36)
MCV RBC AUTO: 96 FL (ref 82–98)
MONOCYTES # BLD AUTO: 0.5 K/UL (ref 0.3–1)
MONOCYTES NFR BLD: 11 % (ref 4–15)
NEUTROPHILS # BLD AUTO: 2 K/UL (ref 1.8–7.7)
NEUTROPHILS NFR BLD: 41.5 % (ref 38–73)
NONHDLC SERPL-MCNC: 175 MG/DL
NRBC BLD-RTO: 0 /100 WBC
PLATELET # BLD AUTO: 257 K/UL (ref 150–450)
PMV BLD AUTO: 10.1 FL (ref 9.2–12.9)
POTASSIUM SERPL-SCNC: 4 MMOL/L (ref 3.5–5.1)
PROT SERPL-MCNC: 7.7 G/DL (ref 6–8.4)
PTH-INTACT SERPL-MCNC: 198.5 PG/ML (ref 9–77)
RBC # BLD AUTO: 4.07 M/UL (ref 4–5.4)
SODIUM SERPL-SCNC: 138 MMOL/L (ref 136–145)
TRIGL SERPL-MCNC: 184 MG/DL (ref 30–150)
TSH SERPL DL<=0.005 MIU/L-ACNC: 2.09 UIU/ML (ref 0.4–4)
WBC # BLD AUTO: 4.92 K/UL (ref 3.9–12.7)

## 2023-02-06 PROCEDURE — 36415 COLL VENOUS BLD VENIPUNCTURE: CPT | Performed by: INTERNAL MEDICINE

## 2023-02-06 PROCEDURE — 83036 HEMOGLOBIN GLYCOSYLATED A1C: CPT | Performed by: INTERNAL MEDICINE

## 2023-02-06 PROCEDURE — 83970 ASSAY OF PARATHORMONE: CPT | Performed by: INTERNAL MEDICINE

## 2023-02-06 PROCEDURE — 80053 COMPREHEN METABOLIC PANEL: CPT | Performed by: INTERNAL MEDICINE

## 2023-02-06 PROCEDURE — 84443 ASSAY THYROID STIM HORMONE: CPT | Performed by: INTERNAL MEDICINE

## 2023-02-06 PROCEDURE — 85025 COMPLETE CBC W/AUTO DIFF WBC: CPT | Performed by: INTERNAL MEDICINE

## 2023-02-06 PROCEDURE — 82306 VITAMIN D 25 HYDROXY: CPT | Performed by: INTERNAL MEDICINE

## 2023-02-06 PROCEDURE — 80061 LIPID PANEL: CPT | Performed by: INTERNAL MEDICINE

## 2023-02-13 ENCOUNTER — OFFICE VISIT (OUTPATIENT)
Dept: INTERNAL MEDICINE | Facility: CLINIC | Age: 73
End: 2023-02-13
Payer: MEDICARE

## 2023-02-13 DIAGNOSIS — N18.32 CHRONIC RENAL FAILURE, STAGE 3B: Primary | ICD-10-CM

## 2023-02-13 DIAGNOSIS — K21.9 GASTROESOPHAGEAL REFLUX DISEASE WITHOUT ESOPHAGITIS: ICD-10-CM

## 2023-02-13 DIAGNOSIS — E66.09 CLASS 1 OBESITY DUE TO EXCESS CALORIES WITH SERIOUS COMORBIDITY AND BODY MASS INDEX (BMI) OF 30.0 TO 30.9 IN ADULT: ICD-10-CM

## 2023-02-13 DIAGNOSIS — I10 ESSENTIAL HYPERTENSION: ICD-10-CM

## 2023-02-13 DIAGNOSIS — I12.9 HYPERTENSIVE NEPHROSCLEROSIS, STAGE 1 THROUGH STAGE 4 OR UNSPECIFIED CHRONIC KIDNEY DISEASE: ICD-10-CM

## 2023-02-13 DIAGNOSIS — R41.3 MEMORY IMPAIRMENT: ICD-10-CM

## 2023-02-13 DIAGNOSIS — N25.81 SECONDARY HYPERPARATHYROIDISM OF RENAL ORIGIN: ICD-10-CM

## 2023-02-13 DIAGNOSIS — M81.0 AGE-RELATED OSTEOPOROSIS WITHOUT CURRENT PATHOLOGICAL FRACTURE: ICD-10-CM

## 2023-02-13 DIAGNOSIS — J45.20 MILD INTERMITTENT ASTHMA WITHOUT COMPLICATION: ICD-10-CM

## 2023-02-13 DIAGNOSIS — G47.30 SLEEP APNEA, UNSPECIFIED TYPE: ICD-10-CM

## 2023-02-13 DIAGNOSIS — E78.2 MIXED HYPERLIPIDEMIA: ICD-10-CM

## 2023-02-13 PROCEDURE — 99214 PR OFFICE/OUTPT VISIT, EST, LEVL IV, 30-39 MIN: ICD-10-PCS | Mod: S$PBB,,, | Performed by: INTERNAL MEDICINE

## 2023-02-13 PROCEDURE — 99999 PR PBB SHADOW E&M-EST. PATIENT-LVL II: CPT | Mod: PBBFAC,,, | Performed by: INTERNAL MEDICINE

## 2023-02-13 PROCEDURE — 99212 OFFICE O/P EST SF 10 MIN: CPT | Mod: PBBFAC | Performed by: INTERNAL MEDICINE

## 2023-02-13 PROCEDURE — 99214 OFFICE O/P EST MOD 30 MIN: CPT | Mod: S$PBB,,, | Performed by: INTERNAL MEDICINE

## 2023-02-13 PROCEDURE — 99999 PR PBB SHADOW E&M-EST. PATIENT-LVL II: ICD-10-PCS | Mod: PBBFAC,,, | Performed by: INTERNAL MEDICINE

## 2023-02-13 RX ORDER — ALBUTEROL SULFATE 90 UG/1
2 AEROSOL, METERED RESPIRATORY (INHALATION) EVERY 8 HOURS
Qty: 18 G | Refills: 3 | Status: SHIPPED | OUTPATIENT
Start: 2023-02-13 | End: 2024-02-06

## 2023-02-13 RX ORDER — PRAVASTATIN SODIUM 20 MG/1
40 TABLET ORAL DAILY
Qty: 180 TABLET | Refills: 4
Start: 2023-02-13 | End: 2023-04-04

## 2023-02-13 NOTE — PROGRESS NOTES
"Chief Complaint  Follow up multiple medical issues, "annual."      HPI  Moriah Cha is a 73 y.o. female with multiple medical diagnoses as listed in the medical history and problem list that presents for annual exam.    Ms. Cha is feeling well at this visit. Her main issue at this time is urinary incontinence which occurs when waking up from sleep in the morning. She denies any urgency, frequency, dysuria, daytime incontinence or stress incontinence. She was recently prescribed oxybutynin by her nephrologist and will monitor her response to this medication. She had a sleep study which came back showing mild sleep apnea without need for CPAP. She complains of some wheezing and cough that occurs when laying down for sleep at night. She has a history of asthma when she lived in [place] but has not had asthmatic symptoms or required rescue inhaler since living in [place]. She has been noticing some memory issues, which her sister has also pointed out to her.    She is a former smoker with <5 pack years and she does not drink alcohol.      PAST MEDICAL HISTORY:  Past Medical History:   Diagnosis Date    Diverticulosis of large intestine without hemorrhage: seen on CT scan April 2017 6/25/2018    Essential hypertension 6/25/2018    Hyperlipidemia     Intramural leiomyoma of uterus: see CT scan April 2017 6/25/2018    Osteoporosis        ALLERGIES AND MEDICATIONS: updated and reviewed.  Review of patient's allergies indicates:   Allergen Reactions    Nsaids (non-steroidal anti-inflammatory drug) Other (See Comments)     CKD     Current Outpatient Medications   Medication Sig Dispense Refill    albuterol (PROVENTIL/VENTOLIN HFA) 90 mcg/actuation inhaler Inhale 2 puffs into the lungs every 8 (eight) hours. 18 g 3    alendronate (FOSAMAX) 70 MG tablet Take 70 mg by mouth.      amLODIPine (NORVASC) 10 MG tablet Take 1 tablet (10 mg total) by mouth once daily. 30 tablet 6    carvediloL (COREG) 3.125 MG tablet Take 3.125 " mg by mouth.      cholecalciferol, vitamin D3, (VITAMIN D3) 1,000 unit capsule Take 2 capsules (2,000 Units total) by mouth once daily. 60 capsule 12    ergocalciferol (ERGOCALCIFEROL) 50,000 unit Cap TAKE 1 CAPSULE BY MOUTH ONE TIME PER WEEK 12 capsule 3    HYDROcodone-acetaminophen (NORCO) 5-325 mg per tablet Take 1 tablet by mouth every 6 (six) hours as needed.      latanoprost 0.005 % ophthalmic solution       omeprazole (PRILOSEC) 40 MG capsule Take 40 mg by mouth once daily.      ondansetron (ZOFRAN-ODT) 8 MG TbDL Take 8 mg by mouth every 8 (eight) hours as needed.      pravastatin (PRAVACHOL) 20 MG tablet Take 2 tablets (40 mg total) by mouth once daily. 180 tablet 4    valsartan (DIOVAN) 320 MG tablet TAKE 1 TABLET (320 MG TOTAL) BY MOUTH ONCE DAILY. 90 tablet 4    vit B complx C/folic acid/zinc (DIALYVITE 800 WITH ZINC 15 ORAL) Take by mouth.       No current facility-administered medications for this visit.         ROS  Review of Systems   Constitutional:  Negative for fatigue, fever and unexpected weight change.   Respiratory:  Positive for cough and wheezing. Negative for apnea, chest tightness and shortness of breath.    Cardiovascular:  Negative for chest pain and palpitations.   Gastrointestinal:  Negative for abdominal pain, blood in stool, constipation and diarrhea.        GERD, which is worse at night   Genitourinary:  Negative for difficulty urinating, dysuria, enuresis, frequency and urgency.        Urinary Incontinence   Musculoskeletal:  Negative for arthralgias and back pain.   Neurological:  Negative for dizziness and headaches.   Psychiatric/Behavioral:  Negative for dysphoric mood. The patient is not nervous/anxious.         See above, denies dysthymia or anxiety, no SI or HI.  Does sometimes feel a little bit scattered, multi tasking  Sleeps fairly well but not 8 hours a night and does feel tired sometimes         Physical Exam  There were no vitals filed for this visit. There is no height  or weight on file to calculate BMI.          Physical Exam  Constitutional:       Appearance: Normal appearance. She is normal weight.   HENT:      Head: Normocephalic and atraumatic.   Cardiovascular:      Rate and Rhythm: Normal rate and regular rhythm.      Pulses: Normal pulses.      Heart sounds: Normal heart sounds. No murmur heard.  Pulmonary:      Effort: Pulmonary effort is normal. No respiratory distress.      Breath sounds: Normal breath sounds. No wheezing.   Abdominal:      General: Abdomen is flat. There is no distension.      Palpations: Abdomen is soft.      Tenderness: There is no abdominal tenderness.   Skin:     General: Skin is warm and dry.   Neurological:      General: No focal deficit present.      Mental Status: She is alert and oriented to person, place, and time.   Psychiatric:         Mood and Affect: Mood normal.         Behavior: Behavior normal.         Health Maintenance         Date Due Completion Date    Colorectal Cancer Screening 06/09/2022 6/9/2017    Mammogram 02/01/2024 2/1/2023    Override on 9/18/2018: Done (Lester Buck.)    Override on 11/15/2017: Done (Piedad Clay)    Hemoglobin A1c (Prediabetes) 02/06/2024 2/6/2023    Lipid Panel 02/06/2024 2/6/2023    DEXA Scan 12/19/2024 12/19/2022    TETANUS VACCINE 01/16/2029 1/16/2019              Assessment and Plan:    Moriah Cha is a 73 y.o. female with multiple medical diagnoses as listed in the medical history and problem list that presents for annual exam.    Chronic renal failure, stage 3b; follows with nephrology.  Gentle hydration, avoid nephrotoxins     Age-related osteoporosis without current pathological fracture; see DEXA 2022- exercise, fall prevention.  Recheck within the next 2 years; continue treatment     Sleep apnea, unspecified type; recent sleep study showed mild JACINDA with no indication for CPAP at this time; sleep hygiene reviewed     Secondary hyperparathyroidism of renal origin:  Stable      Hypertensive nephrosclerosis, stage 1 through stage 4 or unspecified chronic kidney disease; follows with nephrology; stable     Essential hypertension; BP stable at 133/85 today; continue medical regimen     Mixed hyperlipidemia; last labs above target range; increase pravastatin to 40 mg and reassess in 4 months  -     Lipid Panel; Future; Expected date: 06/13/2023  -     AST (SGOT); Future; Expected date: 06/13/2023     Class 1 obesity due to excess calories with serious comorbidity and body mass index (BMI) of 30.0 to 30.9 in adult:  Portion control, exercise and weight loss recommendations reviewed     Mild intermittent asthma without complication; nighttime wheeze and cough; history of asthma while living in [place]  -     albuterol (PROVENTIL/VENTOLIN HFA) 90 mcg/actuation inhaler; Inhale 2 puffs into the lungs every 8 (eight) hours.  Dispense: 18 g; Refill: 3     Gastroesophageal reflux disease without esophagitis; nighttime wheeze and cough when she lays down to sleep; recommended regular use of omeprazole.  Weight loss, GERD cautions discussed, follow-up poor results     Memory impairment; reports difficulty with remembering things, sister is concerned as well; refer for CT head and neuropsych testing  -     Neuropsychological testing; Future  -     CT Head Without Contrast; Future; Expected date: 02/13/2023     Other orders  -     pravastatin (PRAVACHOL) 20 MG tablet; Take 2 tablets (40 mg total) by mouth once daily.  Dispense: 180 tablet; Refill: 4     Jocelyn Jean-Baptiste, MS4  [employer]    Exercise, stress reduction and sleep hygiene issues reviewed at length.  No SI or HI.  Will obtain neuropsych testing.  Sleep apnea issues again reviewed.  Recent labs acceptable  Keep Nephrology follow-up  Increase pravastatin to 40 mg, recheck labs within the next 6 months; cautions and side effects reviewed  Colonoscopy discussed, she states she was given a Cologuard by an outside physician.  Her last  colonoscopy was 5 years ago and was acceptable  I will review all studies and determine further tx depending on findings

## 2023-02-26 LAB — NONINV COLON CA DNA+OCC BLD SCRN STL QL: NEGATIVE

## 2023-02-27 ENCOUNTER — HOSPITAL ENCOUNTER (OUTPATIENT)
Dept: RADIOLOGY | Facility: OTHER | Age: 73
Discharge: HOME | End: 2023-02-27
Attending: INTERNAL MEDICINE
Payer: MEDICARE

## 2023-02-27 DIAGNOSIS — R41.3 MEMORY IMPAIRMENT: ICD-10-CM

## 2023-02-27 PROCEDURE — 70450 CT HEAD WITHOUT CONTRAST: ICD-10-PCS | Mod: 26,,, | Performed by: RADIOLOGY

## 2023-02-27 PROCEDURE — 70450 CT HEAD/BRAIN W/O DYE: CPT | Mod: TC

## 2023-02-27 PROCEDURE — 70450 CT HEAD/BRAIN W/O DYE: CPT | Mod: 26,,, | Performed by: RADIOLOGY

## 2023-03-01 ENCOUNTER — TELEPHONE (OUTPATIENT)
Dept: INTERNAL MEDICINE | Facility: CLINIC | Age: 73
End: 2023-03-01
Payer: MEDICARE

## 2023-03-01 RX ORDER — DOXYCYCLINE HYCLATE 100 MG
100 TABLET ORAL 2 TIMES DAILY
Qty: 14 TABLET | Refills: 0 | Status: SHIPPED | OUTPATIENT
Start: 2023-03-01 | End: 2023-04-04

## 2023-03-01 RX ORDER — BENZONATATE 200 MG/1
200 CAPSULE ORAL 2 TIMES DAILY PRN
Qty: 20 CAPSULE | Refills: 1 | Status: SHIPPED | OUTPATIENT
Start: 2023-03-01 | End: 2024-02-06

## 2023-03-01 NOTE — TELEPHONE ENCOUNTER
I recommend a course of antibiotics and Tessalon Perles which I have sent to her pharmacy.  Increase fluids, appointment with chest x-ray if symptoms do not improve, or if they worsen.  Continue with her inhalers.

## 2023-03-01 NOTE — TELEPHONE ENCOUNTER
Spoke with pt she is not running fever..she has not tried otc meds...the patient want to know what she can take for  continued wheezing constant cough with phlegm that wakes her up at night     Pharmacy updated

## 2023-03-01 NOTE — TELEPHONE ENCOUNTER
----- Message from Jaimee Mendez sent at 3/1/2023  8:19 AM CST -----  Contact: [phone] Patient  Patient would like to get medical advice.  Symptoms (please be specific):   continued wheezing now with a constant cough with phlegm that wakes her up at night  How long have you had these symptoms: ongoing since at least 02/13 and has worsened  Would you like a call back, or a response through your [facility] portal?:   call back  Pharmacy name and phone # (copy from chart):     [id number] [place], [place] - [address] [facility] [address]  Phone: [phone] Fax: [phone]     Comments:   Pt states she had been seen by Dr Herman on 02/13 and prescribed albuterol (PROVENTIL/VENTOLIN HFA) 90 mcg/actuation inhaler

## 2023-03-10 ENCOUNTER — TELEPHONE (OUTPATIENT)
Dept: INTERNAL MEDICINE | Facility: CLINIC | Age: 73
End: 2023-03-10
Payer: MEDICARE

## 2023-03-10 NOTE — TELEPHONE ENCOUNTER
Spoke to pt and notified that DR. Herman is out of the office for 2 weeks , I offer pt a visit for Monday but she decline   Pt stated that she will continue taking her medication and will call back if needed it next week

## 2023-03-10 NOTE — TELEPHONE ENCOUNTER
----- Message from Yehuda Hall sent at 3/10/2023  3:56 PM CST -----  Contact: [phone]  Pt said she needs a call back about some questions she has and that her meds for her coughing she is on her last one and she is stilling coughing a lot in the am. Please call pt back. Pt said she was trying to get this in before the weekend cause her cough meds will be out tomorrow.

## 2023-03-29 ENCOUNTER — TELEPHONE (OUTPATIENT)
Dept: INTERNAL MEDICINE | Facility: CLINIC | Age: 73
End: 2023-03-29
Payer: MEDICARE

## 2023-03-29 NOTE — TELEPHONE ENCOUNTER
----- Message from Charmaine Zavaleta sent at 3/29/2023  1:18 PM CDT -----  Contact: [phone]  Patient had to refill benzonatate (TESSALON) 200 MG capsule and still a little bit of cough early morning and late evening, would like to know if she needs xray, or and advise. Thank you.

## 2023-04-04 ENCOUNTER — OFFICE VISIT (OUTPATIENT)
Dept: INTERNAL MEDICINE | Facility: CLINIC | Age: 73
End: 2023-04-04
Payer: MEDICARE

## 2023-04-04 ENCOUNTER — HOSPITAL ENCOUNTER (OUTPATIENT)
Dept: RADIOLOGY | Facility: HOSPITAL | Age: 73
Discharge: HOME | End: 2023-04-04
Attending: INTERNAL MEDICINE
Payer: MEDICARE

## 2023-04-04 VITALS
HEIGHT: 71 IN | DIASTOLIC BLOOD PRESSURE: 75 MMHG | SYSTOLIC BLOOD PRESSURE: 135 MMHG | BODY MASS INDEX: 30.24 KG/M2 | WEIGHT: 216 LBS

## 2023-04-04 DIAGNOSIS — I10 ESSENTIAL HYPERTENSION: ICD-10-CM

## 2023-04-04 DIAGNOSIS — R05.9 COUGH, UNSPECIFIED TYPE: ICD-10-CM

## 2023-04-04 DIAGNOSIS — N18.32 CHRONIC RENAL FAILURE, STAGE 3B: ICD-10-CM

## 2023-04-04 DIAGNOSIS — J45.20 MILD INTERMITTENT ASTHMA WITHOUT COMPLICATION: ICD-10-CM

## 2023-04-04 DIAGNOSIS — B96.89 ACUTE BACTERIAL SINUSITIS: Primary | ICD-10-CM

## 2023-04-04 DIAGNOSIS — Z87.891 FORMER SMOKER: ICD-10-CM

## 2023-04-04 DIAGNOSIS — J01.90 ACUTE BACTERIAL SINUSITIS: Primary | ICD-10-CM

## 2023-04-04 PROCEDURE — 71046 XR CHEST PA AND LATERAL: ICD-10-PCS | Mod: 26,,, | Performed by: RADIOLOGY

## 2023-04-04 PROCEDURE — 99999 PR PBB SHADOW E&M-EST. PATIENT-LVL IV: CPT | Mod: PBBFAC,,, | Performed by: INTERNAL MEDICINE

## 2023-04-04 PROCEDURE — 99999 PR PBB SHADOW E&M-EST. PATIENT-LVL IV: ICD-10-PCS | Mod: PBBFAC,,, | Performed by: INTERNAL MEDICINE

## 2023-04-04 PROCEDURE — 71046 X-RAY EXAM CHEST 2 VIEWS: CPT | Mod: 26,,, | Performed by: RADIOLOGY

## 2023-04-04 PROCEDURE — 99214 PR OFFICE/OUTPT VISIT, EST, LEVL IV, 30-39 MIN: ICD-10-PCS | Mod: S$PBB,,, | Performed by: INTERNAL MEDICINE

## 2023-04-04 PROCEDURE — 71046 X-RAY EXAM CHEST 2 VIEWS: CPT | Mod: TC

## 2023-04-04 PROCEDURE — 99214 OFFICE O/P EST MOD 30 MIN: CPT | Mod: PBBFAC,25 | Performed by: INTERNAL MEDICINE

## 2023-04-04 PROCEDURE — 99214 OFFICE O/P EST MOD 30 MIN: CPT | Mod: S$PBB,,, | Performed by: INTERNAL MEDICINE

## 2023-04-04 RX ORDER — PRAVASTATIN SODIUM 40 MG/1
40 TABLET ORAL
COMMUNITY
Start: 2023-03-16 | End: 2023-06-15 | Stop reason: SDUPTHER

## 2023-04-04 RX ORDER — AMOXICILLIN AND CLAVULANATE POTASSIUM 875; 125 MG/1; MG/1
1 TABLET, FILM COATED ORAL 2 TIMES DAILY
Qty: 20 TABLET | Refills: 0 | Status: SHIPPED | OUTPATIENT
Start: 2023-04-04 | End: 2024-02-06

## 2023-04-04 RX ORDER — OXYBUTYNIN CHLORIDE 5 MG/1
5 TABLET ORAL ONCE
COMMUNITY
End: 2024-02-06

## 2023-04-04 NOTE — PROGRESS NOTES
Patient ID: Moriah Cha is a 73 y.o. female.    Chief Complaint: Cough      Assessment:       1. Acute bacterial sinusitis    2. Cough, unspecified type    3. Mild intermittent asthma without complication    4. Essential hypertension    5. Chronic renal failure, stage 3b    6. Former smoker: quit age 40, < 10 pack years          Plan:     1. Acute bacterial sinusitis  -     amoxicillin-clavulanate 875-125mg (AUGMENTIN) 875-125 mg per tablet; Take 1 tablet by mouth 2 (two) times daily.  Dispense: 20 tablet; Refill: 0    2. Cough, unspecified type  -     X-Ray Chest PA And Lateral; Future; Expected date: 04/04/2023    3. Mild intermittent asthma without complication    4. Essential hypertension    5. Chronic renal failure, stage 3b    6. Former smoker: quit age 40, < 10 pack years     Rest, fluids, acetaminophen, mucinex and follow up poor results.    If symptoms persist, will consider PFTs, chest CT and or sinus CT   May use Tessalon Perles   I will review all studies and determine further tx depending on findings           Subjective:   Follow up    Slight cough and wheezing since March 1.  Given doxycycline and tessalon and a little bit better but still coughing.  No SOB.    Some slight sinus congestion.    Cough is worse in the morning and at night.  Very congested.    No fevers or chills.    No nausea.  Sometimes coughs so much may regurgitate.    Patient Active Problem List   Diagnosis    Essential hypertension    Mixed hyperlipidemia    Osteoarthritis of both hips    Diverticulosis of large intestine without hemorrhage: seen on CT scan April 2017; also 9/21 ([facility])    Intramural leiomyoma of uterus: see CT scan April 2017, also September 2021    Vitamin D deficiency disease    Mild intermittent asthma without complication    Stress incontinence in female    Secondary hyperparathyroidism of renal origin    Urge incontinence    Age-related osteoporosis without current pathological fracture; see DEXA 2022     Chronic renal failure, stage 3b    Hypertensive nephrosclerosis    Sleep apnea: mild, positional per HST 10/21    Renal cyst: see CT ([facility]) 9/21    Renal stone: see CT ([facility]) 9/21    Class 1 obesity due to excess calories with serious comorbidity and body mass index (BMI) of 30.0 to 30.9 in adult    Gastroesophageal reflux disease without esophagitis    Former smoker: quit age 40, < 10 pack years        Review of Systems   Constitutional:  Positive for fatigue. Negative for chills and fever.   HENT:  Positive for congestion and postnasal drip. Negative for ear pain.    Eyes: Negative.    Respiratory:  Positive for cough. Negative for chest tightness, shortness of breath and wheezing.    Cardiovascular: Negative.    Gastrointestinal: Negative.        Objective:      Physical Exam  Constitutional:       Appearance: She is well-developed.   HENT:      Head: Normocephalic and atraumatic.      Right Ear: External ear normal.      Left Ear: External ear normal.   Eyes:      Extraocular Movements: Extraocular movements intact.      Conjunctiva/sclera: Conjunctivae normal.   Neck:      Thyroid: No thyromegaly.   Cardiovascular:      Rate and Rhythm: Normal rate and regular rhythm.      Heart sounds: Normal heart sounds.   Pulmonary:      Effort: No respiratory distress.      Breath sounds: No wheezing or rales.   Abdominal:      General: Bowel sounds are normal.      Palpations: Abdomen is soft.   Musculoskeletal:      Cervical back: Normal range of motion and neck supple.   Lymphadenopathy:      Cervical: No cervical adenopathy.   Skin:     General: Skin is warm and dry.      Findings: No erythema or rash.   Neurological:      General: No focal deficit present.      Mental Status: She is alert and oriented to person, place, and time.   Psychiatric:         Mood and Affect: Mood normal.         Behavior: Behavior normal.         Thought Content: Thought content normal.         Judgment: Judgment normal.           Health  Maintenance Due   Topic Date Due    Colorectal Cancer Screening  06/09/2022

## 2023-06-07 ENCOUNTER — TELEPHONE (OUTPATIENT)
Dept: INTERNAL MEDICINE | Facility: CLINIC | Age: 73
End: 2023-06-07
Payer: MEDICARE

## 2023-06-07 NOTE — TELEPHONE ENCOUNTER
Yes, best option is for her to fax me the labs from [facility] and then I can determine if she still needs to have those labs.  Is supposed to be a lipid and an AST panel    Please also ask her what is going on, I ordered a colonoscopy in January but I do not see that anything has been scheduled or completed

## 2023-06-07 NOTE — TELEPHONE ENCOUNTER
----- Message from Cece Lopez sent at 6/7/2023  1:46 PM CDT -----  Contact: [phone] @ patient  Good afternoon patient said she took lab work at [facility] 2 weeks ago and wants to know if she still needs to do the lab work on 6/14 Please give patient a call back

## 2023-06-07 NOTE — TELEPHONE ENCOUNTER
Spoke with patient regarding labs advised her that I will send a message to provider to see why they are being drawn and if they are necessary. I provided the fax number to the patient so that she can have [facility] send them over for review.-Page RATLIFF MA

## 2023-06-08 ENCOUNTER — TELEPHONE (OUTPATIENT)
Dept: INTERNAL MEDICINE | Facility: CLINIC | Age: 73
End: 2023-06-08
Payer: MEDICARE

## 2023-06-08 NOTE — TELEPHONE ENCOUNTER
----- Message from Ariane Marin sent at 6/8/2023  1:54 PM CDT -----  Contact: Self/[phone]  Pt said that she is calling in regards to needing to get a return call from the nurse patient stated that she has labs done about 2 weeks ago for Dr Diogenes Calvillo and the results were supposed to be faxed to Dr Herman , pt wants to know if those labs are good enough or does Dr Herman want her to proceed with having the labs she is scheduled for on 6/14. Please advise

## 2023-06-08 NOTE — TELEPHONE ENCOUNTER
Call pt no answer , left detail message to call back and provided with fax number to have her results faxed over

## 2023-06-08 NOTE — TELEPHONE ENCOUNTER
I did receive the outside labs.  I wonder if these were ordered by a different physician, they are not the labs that I ordered.  I see that her kidney function is a little bit more impaired but that her parathyroid levels are slightly better    Perhaps this was ordered by her kidney doctor?    I was interested in seeing her cholesterol and her liver blood work, I ordered those labs in February.  Does she have to go to an outside lab to get her blood work completed?  Otherwise, keep the appointment for June 14th which is basically only for the cholesterol and the liver labs.    Lastly, I never received a report from her Cologuard.  Does she have a copy of that result?  If she can tell us when she did that, Lindy HARPER can try to track it down.  It looks like she is due for colon cancer screening

## 2023-06-08 NOTE — TELEPHONE ENCOUNTER
Spoke to patient and advised of recommendation. Patient verbalized understanding.     Advised that labs that were completed were not the same labs that pcp ordered and she should do labs on the 14th of June     Pt did complete Cologuard.

## 2023-06-14 ENCOUNTER — LAB VISIT (OUTPATIENT)
Dept: LAB | Facility: OTHER | Age: 73
End: 2023-06-14
Attending: INTERNAL MEDICINE
Payer: MEDICARE

## 2023-06-14 ENCOUNTER — PATIENT OUTREACH (OUTPATIENT)
Dept: ADMINISTRATIVE | Facility: HOSPITAL | Age: 73
End: 2023-06-14
Payer: MEDICARE

## 2023-06-14 DIAGNOSIS — E78.2 MIXED HYPERLIPIDEMIA: ICD-10-CM

## 2023-06-14 LAB
AST SERPL-CCNC: 17 U/L (ref 10–40)
CHOLEST SERPL-MCNC: 206 MG/DL (ref 120–199)
CHOLEST/HDLC SERPL: 3.7 {RATIO} (ref 2–5)
HDLC SERPL-MCNC: 56 MG/DL (ref 40–75)
HDLC SERPL: 27.2 % (ref 20–50)
LDLC SERPL CALC-MCNC: 128.8 MG/DL (ref 63–159)
NONHDLC SERPL-MCNC: 150 MG/DL
TRIGL SERPL-MCNC: 106 MG/DL (ref 30–150)

## 2023-06-14 PROCEDURE — 36415 COLL VENOUS BLD VENIPUNCTURE: CPT | Performed by: INTERNAL MEDICINE

## 2023-06-14 PROCEDURE — 80061 LIPID PANEL: CPT | Performed by: INTERNAL MEDICINE

## 2023-06-14 PROCEDURE — 84450 TRANSFERASE (AST) (SGOT): CPT | Performed by: INTERNAL MEDICINE

## 2023-06-14 RX ORDER — OMEPRAZOLE 40 MG/1
1 CAPSULE, DELAYED RELEASE ORAL DAILY
COMMUNITY
Start: 2023-05-26

## 2023-06-14 RX ORDER — CARVEDILOL 3.12 MG/1
1 TABLET ORAL 2 TIMES DAILY WITH MEALS
COMMUNITY
Start: 2023-04-17

## 2023-06-14 NOTE — PROGRESS NOTES
Health Maintenance Due   Topic Date Due    COVID-19 Vaccine (6 - Pfizer series) 03/30/2023     Triggered LINKS. Updated Care Everywhere. Imported outside Cologuard results received from [facility] into [facility]; negative.  frequency set to repeat Cologuard every 3 yrs per pt preference. Chart review completed.

## 2023-06-15 ENCOUNTER — TELEPHONE (OUTPATIENT)
Dept: INTERNAL MEDICINE | Facility: CLINIC | Age: 73
End: 2023-06-15
Payer: MEDICARE

## 2023-06-15 DIAGNOSIS — I10 ESSENTIAL HYPERTENSION: Primary | ICD-10-CM

## 2023-06-15 DIAGNOSIS — N25.81 SECONDARY HYPERPARATHYROIDISM OF RENAL ORIGIN: ICD-10-CM

## 2023-06-15 DIAGNOSIS — R73.01 IFG (IMPAIRED FASTING GLUCOSE): ICD-10-CM

## 2023-06-15 DIAGNOSIS — E78.2 MIXED HYPERLIPIDEMIA: ICD-10-CM

## 2023-06-15 DIAGNOSIS — R53.83 FATIGUE, UNSPECIFIED TYPE: ICD-10-CM

## 2023-06-15 RX ORDER — PRAVASTATIN SODIUM 80 MG/1
80 TABLET ORAL NIGHTLY
Qty: 90 TABLET | Refills: 3 | Status: SHIPPED | OUTPATIENT
Start: 2023-06-15 | End: 2024-06-09

## 2023-06-15 NOTE — TELEPHONE ENCOUNTER
Okay, I recommend increasing to 80 mg and rechecking when she comes in for her annual exam in the next 6 months    Okay to schedule her annual exam in January with Jessy and we can do labs prior, orders in

## 2023-06-15 NOTE — TELEPHONE ENCOUNTER
Spoke to pt and advised of cholesterol levels being a little better but not a t target range.  Pt verbalized understanding.    Stated she has been taking pravastatin as directed for the most part but has missed about 4 night  since her last visit due to falling asleep at night.

## 2023-06-15 NOTE — TELEPHONE ENCOUNTER
Please call [facility] to schedule your follow up growth US for 32 weeks  [phone]    Please call [phone] to set up an appointment for your laboratory draw. SCREENING FOR GESTATIONAL DIABETES     Pregnancy is a stress to your body. One way in which the stress may manifest itself is through an abnormality in sugar metabolism. In non-pregnant patients, this abnormality (a lack of insulin) is known as diabetes. During pregnancy, the insulin normally secreted may become ineffective because of the various hormones and enzymes produced by the placenta. This leads to a condition known as gestational diabetes, Class A diabetes, or pregnancy induced glucose intolerance (PIGI). This condition disappears after delivery. It is known to occur in approximately 5-10% of all pregnancies. It is important because we know that increased maternal blood glucose (sugar) levels can cause fetal problems. That is, the morbidity and mortality rates can be doubled among pregnancies complicated by undiagnosed gestational diabetes. We screen all patients for gestational diabetes at around 24 weeks of pregnancy. The reason we wait until this time is that the stresses of pregnancy leading to diabetes become maximal around this time, therefore, the chance of a false negative test is minimized. If the diabetes is detected at this time, it gives us ample time to correct the glucose metabolism and hopefully, decrease  morbidity and mortality. An order will be placed and the screening will be done in an outpatient lab facility.   Please call the lab to schedule an appointment for this testing, the outpatient labs do not take walk-ins due to the length of this test.  On the day of your test, we suggest you eat a normal breakfast, but eliminate pastries/doughnuts the morning of your test.  We also recommend that you do not eat or drink anything with sugar in it 2 hours prior to your test.   Upon arrival at the Spoke to patient and advised of recommendation. Patient verbalized understanding.       Lab appt scheduled    outpatient laboratory facility, you will be given a 50 gm carbohydrate drink. A blood glucose level will then be drawn one hour after ingestion. If the blood glucose value is normal and no other risk factors exist, no further testing is necessary. If the blood glucose is abnormal, this indicates an increased risk of gestational diabetes and will require a formal three hour glucose tolerance test.  This test will be done within two weeks following your abnormal one hour screen. Approximately 15% of patients with positive screening tests will have an abnormal three hour test glucose tolerance test.     If you have any questions regarding this testing, please feel free to contact us. If gestational diabetes is detected, it will be thoroughly explained to you and discussion on management will take place with one of our physicians. *If your preferred Lab is THE [facility], you may choose either the [place] location or the [place] location, [address] for your testing. You are also welcome to coordinate your lab appointment with your 24 week obstetric appointment so that you may spend your one hour wait time in our office rather than the lab facility [facility] has had significant outbreaks of pertussis (whooping cough) for the last few years. Therefore, the CDC recommends that all pregnant women receive a Tdap vaccine during pregnancy, regardless of whether you have received one previously. The vaccine reduces your chances of romero the illness, and also provides some natural immunity to your baby for possible exposures after birth. The best time to get the vaccine is between 27 and 36 weeks. Baby caregivers (grandparents, spouse) should also receive the vaccine. Influenza- Pregnant women who develop the flu are more prone to serious complications that can affect both mother and baby.   The CDC recommends that all women who will be pregnant during flu season (October to May) receive the flu vaccine (injection only, not nasal spray). The vaccine can be given during any stage of pregnancy. Both vaccines are offered in our office, as well as through many pharmacies and primary care offices. Pregnancy/Nursing and Covid-19 Vaccine   As the [facility] begins to make progress in administering the Covid-19 vaccine, we understand that our pregnant and breastfeeding patients will have questions about the safety of the Covid-19 vaccine. Keep in mind that information is evolving rapidly and that recommendations can change over time. The CDC, the Society for Maternal Fetal Medicine, and the [facility] of Obstetrics & Gynecology now recommend that all pregnant and breastfeeding women consider getting the Covid-19 vaccine, in consultation with their healthcare provider. Factors to consider include the available safety data, risks to pregnant women from Covid-19 infection, and your individual risk for infection and severe disease, based on your risk of exposure and any other medical risk factors that you may have. A recent study of 36,000 pregnant women confirmed the safety and effectiveness of the vaccine, with no increase risk of miscarriage. Here are some facts to consider when you are making a decision:   [1] Pregnant women are at higher risk for acquiring Covid-19 infection and have a subsequent higher risk for the following:  - severe illness  - adverse pregnancy outcomes including  birth  - hospitalization, ICU admission, need for mechanical ventilation and death  [2] The mRNA vaccines that are currently available do not contain live virus, do not enter the nucleus, do not cause genetic changes, do not alter human DNA, and cannot cause Covid-19 illness. [3] mRNA breaks down quickly, so it's unlikely that any of the mRNA in the vaccine would be able to get into your breast milk or into your baby through the placenta.   [4] The antibodies that your body produces in response to the vaccine will be passed to your baby through the placenta and provide some protection for your  baby against Covid-19 infection. [5] Whether you ultimately decide to receive the vaccine or not, do not forget to continue with other preventative measures including frequent hand washing, avoiding crowds, physical distancing and wearing a mask.

## 2023-06-15 NOTE — TELEPHONE ENCOUNTER
Cholesterol looks a little bit better from last time but still not at target range.  Has she been taking her pravastatin as directed?

## 2023-07-05 DIAGNOSIS — E55.9 VITAMIN D DEFICIENCY DISEASE: Primary | ICD-10-CM

## 2023-07-05 RX ORDER — ERGOCALCIFEROL 1.25 MG/1
CAPSULE ORAL
Qty: 12 CAPSULE | Refills: 3 | Status: SHIPPED | OUTPATIENT
Start: 2023-07-05

## 2023-08-15 ENCOUNTER — TELEPHONE (OUTPATIENT)
Dept: INTERNAL MEDICINE | Facility: CLINIC | Age: 73
End: 2023-08-15
Payer: MEDICARE

## 2023-08-15 NOTE — TELEPHONE ENCOUNTER
----- Message from Kia De La Cruz sent at 8/15/2023  2:13 PM CDT -----  Contact: Patient  Type:  Patient Call          Who Called: patient         Does the patient know what this is regarding?: Requesting a call back with questions about having the booster  pt want to know if she should have another booster prior to going out of town ; please advise           Would the patient rather a call back or a response via [facility]? Call           Best Call Back Number: [phone] ([place])              Additional Information:

## 2023-10-16 ENCOUNTER — TELEPHONE (OUTPATIENT)
Dept: INTERNAL MEDICINE | Facility: CLINIC | Age: 73
End: 2023-10-16
Payer: MEDICARE

## 2023-10-16 NOTE — TELEPHONE ENCOUNTER
Spoke to patient, she stated that her nephrologist at [facility] advised her to take calcieriol, vit d, 0.5mcg TID.    Dr. Herman has her on vit d2, 1.25mg, 50,000 units, one cap, once a week. Does she need to take them both?

## 2023-10-16 NOTE — TELEPHONE ENCOUNTER
----- Message from Tash Plaza sent at 10/16/2023  2:25 PM CDT -----  Contact: [phone] Patient  [email or web address]     Patient is calling for Medical Advice regarding:Patent is calling due to the ergocalciferol (ERGOCALCIFEROL) 50,000 unit Cap she gave her . Patient state that her touro Dr told her to take her Vitamin D 3 Times a week .    How long has patient had these symptoms:    Pharmacy name and phone#:    Would like response via fanatixt:  call     Comments:

## 2023-10-30 RX ORDER — CALCITRIOL 0.5 UG/1
CAPSULE ORAL
COMMUNITY
Start: 2023-10-14

## 2023-11-16 ENCOUNTER — TELEPHONE (OUTPATIENT)
Dept: INTERNAL MEDICINE | Facility: CLINIC | Age: 73
End: 2023-11-16
Payer: MEDICARE

## 2023-11-16 NOTE — TELEPHONE ENCOUNTER
Outside labs from her nephrologist.  Metabolic panel acceptable other than creatinine 1.57 with a GFR of 35; CBC acceptable; vitamin-D 76; .  This was done on September 28, 2023

## 2024-01-16 ENCOUNTER — LAB VISIT (OUTPATIENT)
Dept: LAB | Facility: OTHER | Age: 74
End: 2024-01-16
Attending: INTERNAL MEDICINE
Payer: MEDICARE

## 2024-01-16 DIAGNOSIS — E78.2 MIXED HYPERLIPIDEMIA: ICD-10-CM

## 2024-01-16 DIAGNOSIS — R73.01 IFG (IMPAIRED FASTING GLUCOSE): ICD-10-CM

## 2024-01-16 DIAGNOSIS — E55.9 VITAMIN D DEFICIENCY DISEASE: ICD-10-CM

## 2024-01-16 DIAGNOSIS — R53.83 FATIGUE, UNSPECIFIED TYPE: ICD-10-CM

## 2024-01-16 DIAGNOSIS — I10 ESSENTIAL HYPERTENSION: ICD-10-CM

## 2024-01-16 LAB
25(OH)D3+25(OH)D2 SERPL-MCNC: 37 NG/ML (ref 30–96)
ALBUMIN SERPL BCP-MCNC: 4 G/DL (ref 3.5–5.2)
ALP SERPL-CCNC: 91 U/L (ref 55–135)
ALT SERPL W/O P-5'-P-CCNC: 10 U/L (ref 10–44)
ANION GAP SERPL CALC-SCNC: 9 MMOL/L (ref 8–16)
AST SERPL-CCNC: 17 U/L (ref 10–40)
BASOPHILS # BLD AUTO: 0.06 K/UL (ref 0–0.2)
BASOPHILS NFR BLD: 1 % (ref 0–1.9)
BILIRUB SERPL-MCNC: 0.8 MG/DL (ref 0.1–1)
BUN SERPL-MCNC: 32 MG/DL (ref 8–23)
CALCIUM SERPL-MCNC: 9.9 MG/DL (ref 8.7–10.5)
CHLORIDE SERPL-SCNC: 103 MMOL/L (ref 95–110)
CHOLEST SERPL-MCNC: 166 MG/DL (ref 120–199)
CHOLEST/HDLC SERPL: 2.7 {RATIO} (ref 2–5)
CO2 SERPL-SCNC: 27 MMOL/L (ref 23–29)
CREAT SERPL-MCNC: 1.6 MG/DL (ref 0.5–1.4)
DIFFERENTIAL METHOD BLD: ABNORMAL
EOSINOPHIL # BLD AUTO: 0.5 K/UL (ref 0–0.5)
EOSINOPHIL NFR BLD: 9.4 % (ref 0–8)
ERYTHROCYTE [DISTWIDTH] IN BLOOD BY AUTOMATED COUNT: 14.2 % (ref 11.5–14.5)
EST. GFR  (NO RACE VARIABLE): 34 ML/MIN/1.73 M^2
ESTIMATED AVG GLUCOSE: 108 MG/DL (ref 68–131)
GLUCOSE SERPL-MCNC: 89 MG/DL (ref 70–110)
HBA1C MFR BLD: 5.4 % (ref 4–5.6)
HCT VFR BLD AUTO: 40.5 % (ref 37–48.5)
HDLC SERPL-MCNC: 62 MG/DL (ref 40–75)
HDLC SERPL: 37.3 % (ref 20–50)
HGB BLD-MCNC: 12.9 G/DL (ref 12–16)
IMM GRANULOCYTES # BLD AUTO: 0.02 K/UL (ref 0–0.04)
IMM GRANULOCYTES NFR BLD AUTO: 0.3 % (ref 0–0.5)
LDLC SERPL CALC-MCNC: 87 MG/DL (ref 63–159)
LYMPHOCYTES # BLD AUTO: 1.9 K/UL (ref 1–4.8)
LYMPHOCYTES NFR BLD: 33.4 % (ref 18–48)
MCH RBC QN AUTO: 30.1 PG (ref 27–31)
MCHC RBC AUTO-ENTMCNC: 31.9 G/DL (ref 32–36)
MCV RBC AUTO: 95 FL (ref 82–98)
MONOCYTES # BLD AUTO: 0.6 K/UL (ref 0.3–1)
MONOCYTES NFR BLD: 9.9 % (ref 4–15)
NEUTROPHILS # BLD AUTO: 2.6 K/UL (ref 1.8–7.7)
NEUTROPHILS NFR BLD: 46 % (ref 38–73)
NONHDLC SERPL-MCNC: 104 MG/DL
NRBC BLD-RTO: 0 /100 WBC
PLATELET # BLD AUTO: 271 K/UL (ref 150–450)
PMV BLD AUTO: 9.9 FL (ref 9.2–12.9)
POTASSIUM SERPL-SCNC: 3.6 MMOL/L (ref 3.5–5.1)
PROT SERPL-MCNC: 8.7 G/DL (ref 6–8.4)
RBC # BLD AUTO: 4.28 M/UL (ref 4–5.4)
SODIUM SERPL-SCNC: 139 MMOL/L (ref 136–145)
TRIGL SERPL-MCNC: 85 MG/DL (ref 30–150)
TSH SERPL DL<=0.005 MIU/L-ACNC: 1.61 UIU/ML (ref 0.4–4)
WBC # BLD AUTO: 5.74 K/UL (ref 3.9–12.7)

## 2024-01-16 PROCEDURE — 84443 ASSAY THYROID STIM HORMONE: CPT | Performed by: INTERNAL MEDICINE

## 2024-01-16 PROCEDURE — 80053 COMPREHEN METABOLIC PANEL: CPT | Performed by: INTERNAL MEDICINE

## 2024-01-16 PROCEDURE — 83036 HEMOGLOBIN GLYCOSYLATED A1C: CPT | Performed by: INTERNAL MEDICINE

## 2024-01-16 PROCEDURE — 85025 COMPLETE CBC W/AUTO DIFF WBC: CPT | Performed by: INTERNAL MEDICINE

## 2024-01-16 PROCEDURE — 82306 VITAMIN D 25 HYDROXY: CPT | Performed by: INTERNAL MEDICINE

## 2024-01-16 PROCEDURE — 80061 LIPID PANEL: CPT | Performed by: INTERNAL MEDICINE

## 2024-01-16 PROCEDURE — 36415 COLL VENOUS BLD VENIPUNCTURE: CPT | Performed by: INTERNAL MEDICINE

## 2024-02-06 ENCOUNTER — OFFICE VISIT (OUTPATIENT)
Dept: INTERNAL MEDICINE | Facility: CLINIC | Age: 74
End: 2024-02-06
Payer: MEDICARE

## 2024-02-06 VITALS
BODY MASS INDEX: 29.82 KG/M2 | SYSTOLIC BLOOD PRESSURE: 120 MMHG | DIASTOLIC BLOOD PRESSURE: 75 MMHG | HEIGHT: 71 IN | WEIGHT: 213 LBS

## 2024-02-06 DIAGNOSIS — M81.0 AGE-RELATED OSTEOPOROSIS WITHOUT CURRENT PATHOLOGICAL FRACTURE: ICD-10-CM

## 2024-02-06 DIAGNOSIS — D22.30 CHANGE IN FACIAL MOLE: ICD-10-CM

## 2024-02-06 DIAGNOSIS — E55.9 VITAMIN D DEFICIENCY DISEASE: ICD-10-CM

## 2024-02-06 DIAGNOSIS — J45.20 MILD INTERMITTENT ASTHMA WITHOUT COMPLICATION: Primary | ICD-10-CM

## 2024-02-06 DIAGNOSIS — K21.9 GASTROESOPHAGEAL REFLUX DISEASE WITHOUT ESOPHAGITIS: ICD-10-CM

## 2024-02-06 DIAGNOSIS — G47.30 SLEEP APNEA, UNSPECIFIED TYPE: ICD-10-CM

## 2024-02-06 DIAGNOSIS — I10 ESSENTIAL HYPERTENSION: ICD-10-CM

## 2024-02-06 DIAGNOSIS — Z87.891 FORMER SMOKER: ICD-10-CM

## 2024-02-06 DIAGNOSIS — G56.03 BILATERAL CARPAL TUNNEL SYNDROME: ICD-10-CM

## 2024-02-06 DIAGNOSIS — R05.9 COUGH, UNSPECIFIED TYPE: ICD-10-CM

## 2024-02-06 DIAGNOSIS — D72.19 OTHER EOSINOPHILIA: ICD-10-CM

## 2024-02-06 DIAGNOSIS — E78.2 MIXED HYPERLIPIDEMIA: ICD-10-CM

## 2024-02-06 DIAGNOSIS — K57.30 DIVERTICULOSIS OF LARGE INTESTINE WITHOUT HEMORRHAGE: ICD-10-CM

## 2024-02-06 DIAGNOSIS — N18.32 CHRONIC RENAL FAILURE, STAGE 3B: ICD-10-CM

## 2024-02-06 DIAGNOSIS — N25.81 SECONDARY HYPERPARATHYROIDISM OF RENAL ORIGIN: ICD-10-CM

## 2024-02-06 PROBLEM — E66.811 CLASS 1 OBESITY DUE TO EXCESS CALORIES WITH SERIOUS COMORBIDITY AND BODY MASS INDEX (BMI) OF 30.0 TO 30.9 IN ADULT: Status: RESOLVED | Noted: 2021-12-08 | Resolved: 2024-02-06

## 2024-02-06 PROBLEM — E66.09 CLASS 1 OBESITY DUE TO EXCESS CALORIES WITH SERIOUS COMORBIDITY AND BODY MASS INDEX (BMI) OF 30.0 TO 30.9 IN ADULT: Status: RESOLVED | Noted: 2021-12-08 | Resolved: 2024-02-06

## 2024-02-06 PROCEDURE — 99213 OFFICE O/P EST LOW 20 MIN: CPT | Mod: PBBFAC | Performed by: INTERNAL MEDICINE

## 2024-02-06 PROCEDURE — G2211 COMPLEX E/M VISIT ADD ON: HCPCS | Mod: S$PBB,,, | Performed by: INTERNAL MEDICINE

## 2024-02-06 PROCEDURE — 99999 PR PBB SHADOW E&M-EST. PATIENT-LVL III: CPT | Mod: PBBFAC,,, | Performed by: INTERNAL MEDICINE

## 2024-02-06 PROCEDURE — 99214 OFFICE O/P EST MOD 30 MIN: CPT | Mod: S$PBB,,, | Performed by: INTERNAL MEDICINE

## 2024-02-06 NOTE — PROGRESS NOTES
Patient ID: Moriah Cha is a 74 y.o. female.    Chief Complaint: Follow-up      Assessment:       1. Mild intermittent asthma without complication    2. Essential hypertension    3. Mixed hyperlipidemia    4. Chronic renal failure, stage 3b    5. Secondary hyperparathyroidism of renal origin    6. Age-related osteoporosis without current pathological fracture; see DEXA 2022    7. Sleep apnea, unspecified type    8. Former smoker: quit age 40, < 10 pack years    9. Other eosinophilia    10. Vitamin D deficiency disease    11. Change in facial mole    12. Cough, unspecified type    13. Gastroesophageal reflux disease without esophagitis    14. Diverticulosis of large intestine without hemorrhage: seen on CT scan April 2017; also 9/21 ([facility])    15. Bilateral carpal tunnel syndrome          Plan:         1. Mild intermittent asthma without complication  -     CT Chest Without Contrast    2. Essential hypertension    3. Mixed hyperlipidemia    4. Chronic renal failure, stage 3b    5. Secondary hyperparathyroidism of renal origin    6. Age-related osteoporosis without current pathological fracture; see DEXA 2022    7. Sleep apnea, unspecified type    8. Former smoker: quit age 40, < 10 pack years    9. Other eosinophilia  -     CBC Auto Differential; Future; Expected date: 02/06/2024    10. Vitamin D deficiency disease    11. Change in facial mole  -     Ambulatory referral/consult to Dermatology; Future; Expected date: 02/13/2024    12. Cough, unspecified type  -     CT Chest Without Contrast    13. Gastroesophageal reflux disease without esophagitis    14. Diverticulosis of large intestine without hemorrhage: seen on CT scan April 2017; also 9/21 ([facility])    15. Bilateral carpal tunnel syndrome       Medical problems stable  No significant smoking history.  Cough has been persistent, will obtain chest CT for completeness  Mammogram is scheduled outside of [facility]  Labs are stable, keep Nephrology follow-up  CBC in  "4-6 weeks to re-evaluate eosinophilia  JACINDA again discussed  Numerous skin tags, she would like to see Dermatology  Exercise, sleep hygiene, fall prevention reviewed  I will review all studies and determine further tx depending on findings  Screenings up to date  No falls, no fall risk  No depression  Immunizations reviewed    Visit today manifests increased complexity associated with the care of the multiple chronic and episodic problems I addressed, including CKD, eosinophilia, chronic cough, GERD, hypertension, hyperlipidemia.  I am managing a longitudinal care plan of the patient due to the serious and complex problems listed above.     Subjective:   Follow-up multiple medical issues, "annual."    BP doing well    BMI 29    CKD, follows with outside Nephrology.    Getting PT for gait issues, arthritis- did well with this.  Some knee pain, overall doing all right.    Mammogram scheduled for this week at [facility].    Labs acceptable other than CKD.  Vitamin D reviewed.  Eosinophils- very slight.    Mole on face x 1 year.    Sometimes upon awakening hands are a little numb, if she shakes or moves them sx tray.   Symptoms are reminiscent of her carpal tunnel syndrome.  She has not had any weakness or numbness in any other location.  No symptoms during the day.    Patient Active Problem List   Diagnosis    Essential hypertension    Mixed hyperlipidemia    Osteoarthritis of both hips    Diverticulosis of large intestine without hemorrhage: seen on CT scan April 2017; also 9/21 ([facility])    Intramural leiomyoma of uterus: see CT scan April 2017, also September 2021    Vitamin D deficiency disease    Mild intermittent asthma without complication    Stress incontinence in female    Secondary hyperparathyroidism of renal origin    Urge incontinence    Age-related osteoporosis without current pathological fracture; see DEXA 2022    Chronic renal failure, stage 3b    Hypertensive nephrosclerosis    Sleep apnea: mild, positional " per HST 10/21    Renal cyst: see CT ([facility]) 9/21    Renal stone: see CT ([facility]) 9/21    Gastroesophageal reflux disease without esophagitis    Former smoker: quit age 40, < 10 pack years    Other eosinophilia    Bilateral carpal tunnel syndrome      Review of Systems   Constitutional:  Negative for fatigue.   HENT:  Negative for hearing loss.    Eyes:  Negative for visual disturbance.   Respiratory:  Negative for shortness of breath.    Cardiovascular:  Negative for chest pain.   Gastrointestinal:  Negative for abdominal pain, constipation and diarrhea.   Genitourinary:  Negative for dysuria, frequency and vaginal bleeding.   Musculoskeletal:  Positive for arthralgias. Negative for joint swelling.   Skin:  Negative for rash.   Neurological:  Positive for numbness. Negative for weakness, light-headedness and headaches.   Psychiatric/Behavioral:  Negative for sleep disturbance.          Objective:      Physical Exam  Vitals and nursing note reviewed.   Constitutional:       Appearance: She is well-developed.   HENT:      Head: Normocephalic and atraumatic.      Right Ear: External ear normal.      Left Ear: External ear normal.      Nose: Nose normal.      Mouth/Throat:      Pharynx: No oropharyngeal exudate.   Eyes:      General: No scleral icterus.     Extraocular Movements: Extraocular movements intact.      Conjunctiva/sclera: Conjunctivae normal.   Neck:      Thyroid: No thyromegaly.      Vascular: No JVD.   Cardiovascular:      Rate and Rhythm: Normal rate and regular rhythm.      Heart sounds: Normal heart sounds. No murmur heard.     No gallop.   Pulmonary:      Effort: Pulmonary effort is normal. No respiratory distress.      Breath sounds: Normal breath sounds. No wheezing.   Abdominal:      General: Bowel sounds are normal. There is no distension.      Palpations: Abdomen is soft. There is no mass.      Tenderness: There is no abdominal tenderness. There is no guarding or rebound.   Musculoskeletal:          General: No tenderness. Normal range of motion.      Cervical back: Normal range of motion and neck supple.   Lymphadenopathy:      Cervical: No cervical adenopathy.   Skin:     General: Skin is warm.      Findings: No erythema or rash.      Comments: Multiple skin tags on neck, also skin tag above left eyebrow   Neurological:      General: No focal deficit present.      Mental Status: She is alert and oriented to person, place, and time.      Cranial Nerves: No cranial nerve deficit.      Coordination: Coordination normal.      Comments: Flattening of thenar eminences bilaterally   Psychiatric:         Behavior: Behavior normal.         Thought Content: Thought content normal.         Judgment: Judgment normal.             Health Maintenance Due   Topic Date Due    Mammogram  02/01/2024

## 2024-02-16 ENCOUNTER — HOSPITAL ENCOUNTER (OUTPATIENT)
Dept: RADIOLOGY | Facility: HOSPITAL | Age: 74
Discharge: HOME | End: 2024-02-16
Attending: INTERNAL MEDICINE
Payer: MEDICARE

## 2024-02-16 PROCEDURE — 71250 CT THORAX DX C-: CPT | Mod: TC

## 2024-02-16 PROCEDURE — 71250 CT THORAX DX C-: CPT | Mod: 26,,, | Performed by: RADIOLOGY

## 2024-02-21 ENCOUNTER — TELEPHONE (OUTPATIENT)
Dept: INTERNAL MEDICINE | Facility: CLINIC | Age: 74
End: 2024-02-21
Payer: MEDICARE

## 2024-02-21 DIAGNOSIS — J47.9 BRONCHIECTASIS WITHOUT COMPLICATION: ICD-10-CM

## 2024-02-21 DIAGNOSIS — I70.0 AORTIC ATHEROSCLEROSIS: Primary | ICD-10-CM

## 2024-02-21 RX ORDER — OXYBUTYNIN CHLORIDE 5 MG/1
1 TABLET ORAL DAILY
COMMUNITY
Start: 2024-02-14

## 2024-02-27 RX ORDER — VALSARTAN 320 MG/1
320 TABLET ORAL
Qty: 90 TABLET | Refills: 3 | Status: SHIPPED | OUTPATIENT
Start: 2024-02-27

## 2024-03-20 ENCOUNTER — LAB VISIT (OUTPATIENT)
Dept: LAB | Facility: OTHER | Age: 74
End: 2024-03-20
Attending: INTERNAL MEDICINE
Payer: MEDICARE

## 2024-03-20 DIAGNOSIS — D72.19 OTHER EOSINOPHILIA: ICD-10-CM

## 2024-03-20 LAB
BASOPHILS # BLD AUTO: 0.05 K/UL (ref 0–0.2)
BASOPHILS NFR BLD: 0.9 % (ref 0–1.9)
DIFFERENTIAL METHOD BLD: ABNORMAL
EOSINOPHIL # BLD AUTO: 0.5 K/UL (ref 0–0.5)
EOSINOPHIL NFR BLD: 8.7 % (ref 0–8)
ERYTHROCYTE [DISTWIDTH] IN BLOOD BY AUTOMATED COUNT: 13.5 % (ref 11.5–14.5)
HCT VFR BLD AUTO: 38.3 % (ref 37–48.5)
HGB BLD-MCNC: 12 G/DL (ref 12–16)
IMM GRANULOCYTES # BLD AUTO: 0.01 K/UL (ref 0–0.04)
IMM GRANULOCYTES NFR BLD AUTO: 0.2 % (ref 0–0.5)
LYMPHOCYTES # BLD AUTO: 2.2 K/UL (ref 1–4.8)
LYMPHOCYTES NFR BLD: 39.7 % (ref 18–48)
MCH RBC QN AUTO: 30 PG (ref 27–31)
MCHC RBC AUTO-ENTMCNC: 31.3 G/DL (ref 32–36)
MCV RBC AUTO: 96 FL (ref 82–98)
MONOCYTES # BLD AUTO: 0.6 K/UL (ref 0.3–1)
MONOCYTES NFR BLD: 10.6 % (ref 4–15)
NEUTROPHILS # BLD AUTO: 2.3 K/UL (ref 1.8–7.7)
NEUTROPHILS NFR BLD: 39.9 % (ref 38–73)
NRBC BLD-RTO: 0 /100 WBC
PLATELET # BLD AUTO: 237 K/UL (ref 150–450)
PMV BLD AUTO: 9.7 FL (ref 9.2–12.9)
RBC # BLD AUTO: 4 M/UL (ref 4–5.4)
WBC # BLD AUTO: 5.64 K/UL (ref 3.9–12.7)

## 2024-03-20 PROCEDURE — 85025 COMPLETE CBC W/AUTO DIFF WBC: CPT | Performed by: INTERNAL MEDICINE

## 2024-03-20 PROCEDURE — 36415 COLL VENOUS BLD VENIPUNCTURE: CPT | Performed by: INTERNAL MEDICINE

## 2024-03-28 ENCOUNTER — TELEPHONE (OUTPATIENT)
Dept: INTERNAL MEDICINE | Facility: CLINIC | Age: 74
End: 2024-03-28
Payer: MEDICARE

## 2024-03-28 DIAGNOSIS — E53.8 DEFICIENCY OF OTHER SPECIFIED B GROUP VITAMINS: ICD-10-CM

## 2024-03-28 DIAGNOSIS — D72.19 OTHER EOSINOPHILIA: Primary | ICD-10-CM

## 2024-03-28 NOTE — TELEPHONE ENCOUNTER
Okay to let her know that her labs are acceptable (she has not on the patient portal)    CBC is improved although she still does have some eosinophils, which are a particular type of white blood cell that sometimes are elevated with allergies or asthma, or even a parasite.  Her levels are not very high but I do want to continue to monitor    I would like to repeat labs to re-evaluate, orders in, thank you    Please schedule, thank you.    It looks like those labs can be added to the labs that she is getting in April

## 2024-03-28 NOTE — TELEPHONE ENCOUNTER
Called and spoke with pt, informed her of recent blood work. Pt expressed understanding, blood work linked to appt in April

## 2024-04-23 ENCOUNTER — LAB VISIT (OUTPATIENT)
Dept: LAB | Facility: OTHER | Age: 74
End: 2024-04-23
Attending: INTERNAL MEDICINE
Payer: MEDICARE

## 2024-04-23 DIAGNOSIS — D72.19 OTHER EOSINOPHILIA: ICD-10-CM

## 2024-04-23 DIAGNOSIS — E53.8 DEFICIENCY OF OTHER SPECIFIED B GROUP VITAMINS: ICD-10-CM

## 2024-04-23 LAB
BASOPHILS # BLD AUTO: 0.06 K/UL (ref 0–0.2)
BASOPHILS NFR BLD: 1.2 % (ref 0–1.9)
DIFFERENTIAL METHOD BLD: ABNORMAL
EOSINOPHIL # BLD AUTO: 0.4 K/UL (ref 0–0.5)
EOSINOPHIL NFR BLD: 8.8 % (ref 0–8)
ERYTHROCYTE [DISTWIDTH] IN BLOOD BY AUTOMATED COUNT: 13.5 % (ref 11.5–14.5)
HCT VFR BLD AUTO: 39.1 % (ref 37–48.5)
HGB BLD-MCNC: 12.1 G/DL (ref 12–16)
IMM GRANULOCYTES # BLD AUTO: 0 K/UL (ref 0–0.04)
IMM GRANULOCYTES NFR BLD AUTO: 0 % (ref 0–0.5)
LYMPHOCYTES # BLD AUTO: 1.8 K/UL (ref 1–4.8)
LYMPHOCYTES NFR BLD: 36.5 % (ref 18–48)
MCH RBC QN AUTO: 29.7 PG (ref 27–31)
MCHC RBC AUTO-ENTMCNC: 30.9 G/DL (ref 32–36)
MCV RBC AUTO: 96 FL (ref 82–98)
MONOCYTES # BLD AUTO: 0.5 K/UL (ref 0.3–1)
MONOCYTES NFR BLD: 10.4 % (ref 4–15)
NEUTROPHILS # BLD AUTO: 2.2 K/UL (ref 1.8–7.7)
NEUTROPHILS NFR BLD: 43.1 % (ref 38–73)
NRBC BLD-RTO: 0 /100 WBC
PLATELET # BLD AUTO: 241 K/UL (ref 150–450)
PMV BLD AUTO: 9.7 FL (ref 9.2–12.9)
RBC # BLD AUTO: 4.08 M/UL (ref 4–5.4)
VIT B12 SERPL-MCNC: 516 PG/ML (ref 210–950)
WBC # BLD AUTO: 5.02 K/UL (ref 3.9–12.7)

## 2024-04-23 PROCEDURE — 84484 ASSAY OF TROPONIN QUANT: CPT | Performed by: INTERNAL MEDICINE

## 2024-04-23 PROCEDURE — 82607 VITAMIN B-12: CPT | Performed by: INTERNAL MEDICINE

## 2024-04-23 PROCEDURE — 85025 COMPLETE CBC W/AUTO DIFF WBC: CPT | Performed by: INTERNAL MEDICINE

## 2024-04-25 LAB
STRONGYLOIDES ANTIBODY IGG: NEGATIVE
TROPONIN T SERPL-MCNC: 12 NG/L
TRYPTASE LEVEL: 6.7 NG/ML

## 2024-04-26 ENCOUNTER — TELEPHONE (OUTPATIENT)
Dept: INTERNAL MEDICINE | Facility: CLINIC | Age: 74
End: 2024-04-26
Payer: MEDICARE

## 2024-04-26 DIAGNOSIS — D72.19 OTHER EOSINOPHILIA: Primary | ICD-10-CM

## 2024-04-26 DIAGNOSIS — N18.32 CHRONIC RENAL FAILURE, STAGE 3B: ICD-10-CM

## 2024-04-26 NOTE — TELEPHONE ENCOUNTER
Her repeat labs continue to show a little bit of eosinophils in her blood.  I do not see an obvious reason based on the follow-up lab testing.    Next step would be to have her see an allergist and I have placed a referral, please assist with appointment, thank you

## 2024-04-29 NOTE — TELEPHONE ENCOUNTER
Called and spoke with pt, informed her of Dr. Herman's message. Pt expressed understanding, allergy appt scheduled. Pt had non related questions about vaccines. She wanted to know if she completed her shingles vaccines. She states she also heard that she should get the covid booster every 4 months since she is over 65, she wanted to know if that was accurate.

## 2024-04-29 NOTE — TELEPHONE ENCOUNTER
----- Message from Padmini Wei MA sent at 12/27/2019  2:20 PM CST -----  Contact: [phone]   Pt said that Dr Jane doubled her doasage of Enablex. She will need a new Rx sent to her pharmacy with the change of dosage, please       [id number] [address] [phone] (Phone)  [phone] (Fax)  
PERIPHERAL EDEMA
none

## 2024-04-30 NOTE — TELEPHONE ENCOUNTER
She has completed her shingles vaccine series  She could get a COVID booster, they are recommending a COVID booster 4 months after the most recent COVID shot and she had her last shot in October of 2023  She is due for a urine which I have ordered   This is the ECV patient of the CNM's I am asking about.

## 2024-05-03 ENCOUNTER — OFFICE VISIT (OUTPATIENT)
Dept: PULMONOLOGY | Facility: CLINIC | Age: 74
End: 2024-05-03
Payer: MEDICARE

## 2024-05-03 ENCOUNTER — IMMUNIZATION (OUTPATIENT)
Dept: INTERNAL MEDICINE | Facility: CLINIC | Age: 74
End: 2024-05-03
Payer: MEDICARE

## 2024-05-03 VITALS
OXYGEN SATURATION: 98 % | WEIGHT: 213.88 LBS | HEIGHT: 71 IN | BODY MASS INDEX: 29.94 KG/M2 | SYSTOLIC BLOOD PRESSURE: 124 MMHG | HEART RATE: 86 BPM | DIASTOLIC BLOOD PRESSURE: 78 MMHG

## 2024-05-03 DIAGNOSIS — J47.9 BRONCHIECTASIS WITHOUT COMPLICATION: ICD-10-CM

## 2024-05-03 DIAGNOSIS — J45.20 MILD INTERMITTENT ASTHMA WITHOUT COMPLICATION: Primary | ICD-10-CM

## 2024-05-03 DIAGNOSIS — Z23 NEED FOR VACCINATION: Primary | ICD-10-CM

## 2024-05-03 PROCEDURE — 90480 ADMN SARSCOV2 VAC 1/ONLY CMP: CPT | Mod: PBBFAC

## 2024-05-03 PROCEDURE — 99213 OFFICE O/P EST LOW 20 MIN: CPT | Mod: PBBFAC,25 | Performed by: INTERNAL MEDICINE

## 2024-05-03 PROCEDURE — 99999 PR PBB SHADOW E&M-EST. PATIENT-LVL III: CPT | Mod: PBBFAC,,, | Performed by: INTERNAL MEDICINE

## 2024-05-03 PROCEDURE — 91322 SARSCOV2 VAC 50 MCG/0.5ML IM: CPT | Mod: PBBFAC

## 2024-05-03 PROCEDURE — 99204 OFFICE O/P NEW MOD 45 MIN: CPT | Mod: S$PBB,,, | Performed by: INTERNAL MEDICINE

## 2024-05-03 PROCEDURE — 99999PBSHW COVID-19 VAC, MRNA 2023 (MODERNA)(PF) 50 MCG/0.5 ML IM SUSR (12+): Mod: PBBFAC,,,

## 2024-05-03 NOTE — PROGRESS NOTES
"Subjective:      Patient ID: Moriah Cha is a 74 y.o. female.    Chief Complaint: Bronchiectasis and Wheezing    Pt is a 73 yo AAF pmh mild intermittent asthma, bronchiectasis, HTN, HLD, CKD3b, diverticulosis, JACINDA who presents for evaluation of bronchiectasis/wheezing.     Smoking hx: quit ~1998, 10 cigs a day, ~8 years  Work hx: [employer], [email or web address], [employer]   Exposure hx: no chem/fumes, previous cat/dog, current- 2 dogs. Down material in house.   Inhaler use: none  PRN inhaler use: albuterol- every other day when wake up/go to sleep regardless of symptoms  Hx of lung dz: PNA- '96  Family hx of lung dz: none    Review of Systems   HENT:  Positive for postnasal drip (intermittent) and rhinorrhea (intermitent.). Negative for congestion.    Respiratory:  Positive for cough (intermittent) and use of rescue inhaler. Negative for sputum production, chest tightness, shortness of breath, wheezing and dyspnea on extertion.    Cardiovascular:  Negative for chest pain, palpitations and leg swelling.   Gastrointestinal:  Negative for nausea and vomiting.   Psychiatric/Behavioral:  Negative for confusion and sleep disturbance. The patient is not nervous/anxious.        Objective:     Physical Exam    Personal Diagnostic Review    Eso: 400-500    CT of chest performed on 2/16/24 without contrast revealed minimal lower lobe bronchiectasis. No other parenchymal or interstitial disease noted.        2/6/2024    11:06 AM 4/4/2023     1:03 PM 12/8/2021    11:24 AM 5/6/2021     2:21 PM 3/8/2021     4:10 PM 3/13/2020    10:58 AM 10/30/2019    11:58 AM   Pulmonary Function Tests   Height 5' 11" (1.803 m) 5' 11" (1.803 m) 5' 11" (1.803 m) 5' 11" (1.803 m) 5' 11" (1.803 m)  5' 9" (1.753 m)   Weight 96.6 kg (213 lb) 98 kg (216 lb) 98 kg (216 lb) 97.3 kg (214 lb 8.1 oz) 95.7 kg (211 lb) 80 kg (176 lb 5.9 oz) 89 kg (196 lb 3.4 oz)   BMI (Calculated) 29.7 30.1 30.1 29.9 29.4  29       Assessment:     No diagnosis " found.     Outpatient Encounter Medications as of 5/3/2024   Medication Sig Dispense Refill    albuterol (PROVENTIL/VENTOLIN HFA) 90 mcg/actuation inhaler Inhale 2 puffs into the lungs every 8 (eight) hours. 18 g 3    alendronate (FOSAMAX) 70 MG tablet Take 70 mg by mouth.      amLODIPine (NORVASC) 10 MG tablet Take 1 tablet (10 mg total) by mouth once daily. 30 tablet 6    calcitRIOL (ROCALTROL) 0.5 MCG Cap Take by mouth.      carvediloL (COREG) 3.125 MG tablet Take 1 tablet by mouth 2 (two) times daily with meals.      cholecalciferol, vitamin D3, (VITAMIN D3) 1,000 unit capsule Take 2 capsules (2,000 Units total) by mouth once daily. 60 capsule 12    ergocalciferol (ERGOCALCIFEROL) 50,000 unit Cap TAKE 1 CAPSULE BY MOUTH ONE TIME PER WEEK 12 capsule 3    latanoprost 0.005 % ophthalmic solution       omeprazole (PRILOSEC) 40 MG capsule Take 1 capsule by mouth once daily.      oxybutynin (DITROPAN) 5 MG Tab Take 1 tablet by mouth once daily.      pravastatin (PRAVACHOL) 80 MG tablet Take 1 tablet (80 mg total) by mouth every evening. 90 tablet 3    valsartan (DIOVAN) 320 MG tablet TAKE 1 TABLET BY MOUTH EVERY DAY 90 tablet 3    vit B complx C/folic acid/zinc (DIALYVITE 800 WITH ZINC 15 ORAL) Take by mouth.       No facility-administered encounter medications on file as of 5/3/2024.     No orders of the defined types were placed in this encounter.    Plan:     Bronchiectasis: see CT 2/24  Minimal bronchiectasis without previous imaging to determine chronicity. Minimal symptoms. No further work up necessary at this time.     Mild intermittent asthma without complication  Mild symptoms of cough with no shortness of breath per patient. Uses albuterol every other day BID regardless of symptoms. Symptoms worsen during allergy season with post nasal drip. Would start saline sinus rinses when allergy symptoms present and consider adding flonase or azelastine sprays. If symptoms become more chronic or worsen would consider  ICS/LABA.     Follow up in 3 months.     Eleazar Vidales MD  [facility]

## 2024-05-07 NOTE — ASSESSMENT & PLAN NOTE
Mild symptoms of cough with no shortness of breath per patient. Uses albuterol every other day BID regardless of symptoms. Symptoms worsen during allergy season with post nasal drip. Would start saline sinus rinses when allergy symptoms present and consider adding flonase or azelastine sprays. If symptoms become more chronic or worsen would consider ICS/LABA.

## 2024-05-07 NOTE — ASSESSMENT & PLAN NOTE
Minimal bronchiectasis without previous imaging to determine chronicity. Minimal symptoms. No further work up necessary at this time.

## 2024-05-08 ENCOUNTER — TELEPHONE (OUTPATIENT)
Dept: INTERNAL MEDICINE | Facility: CLINIC | Age: 74
End: 2024-05-08
Payer: MEDICARE

## 2024-05-08 DIAGNOSIS — D72.19 OTHER EOSINOPHILIA: Primary | ICD-10-CM

## 2024-05-08 NOTE — TELEPHONE ENCOUNTER
Sure, that is fine.  I would like for her to do a CBC in 3 months and then we can determine if she still needs to see the allergist.  She can cancel the allergy appointment.  CBC ordered today thank you please schedule

## 2024-05-08 NOTE — TELEPHONE ENCOUNTER
----- Message from Katie ROSELYN Maite sent at 5/8/2024  1:59 PM CDT -----  Contact: [phone]  Pt saw Dr Vidales in Pulmonology and he recommended she continue albuterol, take Prilosec, and added a nasal rinse. He thinks this will improve her condition and she may not need to see allergist. She is following up with him in August. Pt wants to confirm it is okay with Dr Herman to cancel the allergy appt she has upcoming. If this does not work she will schedule with allergy in 3 months if needed. Please advise.

## 2024-05-14 NOTE — TELEPHONE ENCOUNTER
Appointment scheduled on 08/26/2024 for labs.Then a decision will be made to see allergist.Patient Verbalized understanding

## 2024-06-28 NOTE — TELEPHONE ENCOUNTER
No care due was identified.  [facility] Embedded Care Due Messages. Reference number: [id number].   6/28/2024 3:49:08 PM CDT

## 2024-07-01 RX ORDER — AMLODIPINE BESYLATE 10 MG/1
10 TABLET ORAL DAILY
Qty: 30 TABLET | Refills: 6 | Status: SHIPPED | OUTPATIENT
Start: 2024-07-01

## 2024-08-12 RX ORDER — PRAVASTATIN SODIUM 80 MG/1
80 TABLET ORAL NIGHTLY
Qty: 90 TABLET | Refills: 1 | Status: SHIPPED | OUTPATIENT
Start: 2024-08-12

## 2024-08-12 NOTE — TELEPHONE ENCOUNTER
Moriah Cha  is requesting a refill authorization.  Brief Assessment and Rationale for Refill:  Approve     Medication Therapy Plan:         Comments:     Note composed:6:15 AM 08/12/2024

## 2024-08-12 NOTE — TELEPHONE ENCOUNTER
No care due was identified.  [facility] Embedded Care Due Messages. Reference number: [id number].   8/12/2024 12:49:02 AM CDT

## 2024-08-26 ENCOUNTER — OFFICE VISIT (OUTPATIENT)
Dept: PULMONOLOGY | Facility: CLINIC | Age: 74
End: 2024-08-26
Payer: MEDICARE

## 2024-08-26 ENCOUNTER — LAB VISIT (OUTPATIENT)
Dept: LAB | Facility: HOSPITAL | Age: 74
End: 2024-08-26
Attending: INTERNAL MEDICINE
Payer: MEDICARE

## 2024-08-26 VITALS
WEIGHT: 212.31 LBS | HEIGHT: 71 IN | SYSTOLIC BLOOD PRESSURE: 124 MMHG | OXYGEN SATURATION: 99 % | DIASTOLIC BLOOD PRESSURE: 74 MMHG | HEART RATE: 88 BPM | BODY MASS INDEX: 29.72 KG/M2

## 2024-08-26 DIAGNOSIS — J45.20 MILD INTERMITTENT ASTHMA WITHOUT COMPLICATION: ICD-10-CM

## 2024-08-26 DIAGNOSIS — J47.9 BRONCHIECTASIS WITHOUT COMPLICATION: Primary | ICD-10-CM

## 2024-08-26 DIAGNOSIS — D72.19 OTHER EOSINOPHILIA: ICD-10-CM

## 2024-08-26 LAB
BASOPHILS # BLD AUTO: 0.05 K/UL (ref 0–0.2)
BASOPHILS NFR BLD: 0.9 % (ref 0–1.9)
DIFFERENTIAL METHOD BLD: ABNORMAL
EOSINOPHIL # BLD AUTO: 0.5 K/UL (ref 0–0.5)
EOSINOPHIL NFR BLD: 9.1 % (ref 0–8)
ERYTHROCYTE [DISTWIDTH] IN BLOOD BY AUTOMATED COUNT: 14.4 % (ref 11.5–14.5)
HCT VFR BLD AUTO: 39.4 % (ref 37–48.5)
HGB BLD-MCNC: 12.6 G/DL (ref 12–16)
IMM GRANULOCYTES # BLD AUTO: 0.02 K/UL (ref 0–0.04)
IMM GRANULOCYTES NFR BLD AUTO: 0.4 % (ref 0–0.5)
LYMPHOCYTES # BLD AUTO: 2 K/UL (ref 1–4.8)
LYMPHOCYTES NFR BLD: 35.2 % (ref 18–48)
MCH RBC QN AUTO: 30.8 PG (ref 27–31)
MCHC RBC AUTO-ENTMCNC: 32 G/DL (ref 32–36)
MCV RBC AUTO: 96 FL (ref 82–98)
MONOCYTES # BLD AUTO: 0.6 K/UL (ref 0.3–1)
MONOCYTES NFR BLD: 10 % (ref 4–15)
NEUTROPHILS # BLD AUTO: 2.5 K/UL (ref 1.8–7.7)
NEUTROPHILS NFR BLD: 44.4 % (ref 38–73)
NRBC BLD-RTO: 0 /100 WBC
PLATELET # BLD AUTO: 231 K/UL (ref 150–450)
PMV BLD AUTO: 9.8 FL (ref 9.2–12.9)
RBC # BLD AUTO: 4.09 M/UL (ref 4–5.4)
WBC # BLD AUTO: 5.59 K/UL (ref 3.9–12.7)

## 2024-08-26 PROCEDURE — 85025 COMPLETE CBC W/AUTO DIFF WBC: CPT | Performed by: INTERNAL MEDICINE

## 2024-08-26 PROCEDURE — 36415 COLL VENOUS BLD VENIPUNCTURE: CPT | Performed by: INTERNAL MEDICINE

## 2024-08-26 PROCEDURE — 99214 OFFICE O/P EST MOD 30 MIN: CPT | Mod: PBBFAC | Performed by: INTERNAL MEDICINE

## 2024-08-26 PROCEDURE — 99999 PR PBB SHADOW E&M-EST. PATIENT-LVL IV: CPT | Mod: PBBFAC,,, | Performed by: INTERNAL MEDICINE

## 2024-08-26 PROCEDURE — 99213 OFFICE O/P EST LOW 20 MIN: CPT | Mod: S$PBB,,, | Performed by: INTERNAL MEDICINE

## 2024-08-26 NOTE — PATIENT INSTRUCTIONS
Allergy season with worsening cough:     1st line: Two times a day sinus rinses    2nd line: Antihistamine once: zyrtec, allegra, xyzal, Claritin    3rd line: Flonase (OTC- steroid spray), Azelastine (antihistamine spray)

## 2024-08-26 NOTE — PROGRESS NOTES
Subjective:      Patient ID: Moriah Cha is a 74 y.o. female.    Chief Complaint: Follow-up    Pt is a 73 yo AAF pmh mild intermittent asthma, bronchiectasis, HTN, HLD, CKD3b, diverticulosis, JACINDA who presents for evaluation of bronchiectasis/wheezing.      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 Down material in house.   Inhaler use: none  PRN inhaler use: albuterol- every couple of days  Hx of lung dz: PNA- '96  Family hx of lung dz: none    Since last being seen, patient has been doing well. Denies cough or wheezing. Denies shortness of breath, PND.     Review of Systems   HENT:  Negative for postnasal drip, rhinorrhea and congestion.    Respiratory:  Negative for cough, sputum production, chest tightness, shortness of breath, wheezing, dyspnea on extertion and use of rescue inhaler.    Cardiovascular:  Negative for chest pain, palpitations and leg swelling.   Gastrointestinal:  Negative for nausea and vomiting.   Psychiatric/Behavioral:  Negative for confusion and sleep disturbance. The patient is not nervous/anxious.      Objective:     Physical Exam   Constitutional: She is oriented to person, place, and time. She appears well-developed and well-nourished. She is not obese.   HENT:   Head: Normocephalic.   Cardiovascular: Normal rate, regular rhythm and normal heart sounds.   Pulmonary/Chest: Normal expansion, symmetric chest wall expansion, effort normal and breath sounds normal.   Musculoskeletal:         General: No edema.   Neurological: She is alert and oriented to person, place, and time. Gait normal.   Skin: No cyanosis. Nails show no clubbing.   Psychiatric: She has a normal mood and affect. Her behavior is normal. Judgment and thought content normal.   Vitals reviewed.    Personal Diagnostic Review    Eso: 400-500     CT of chest performed on 2/16/24 without  "contrast revealed minimal lower lobe bronchiectasis. No other parenchymal or interstitial disease noted.        5/3/2024    10:36 AM 2/6/2024    11:06 AM 4/4/2023     1:03 PM 12/8/2021    11:24 AM 5/6/2021     2:21 PM 3/8/2021     4:10 PM 3/13/2020    10:58 AM   Pulmonary Function Tests   SpO2 98 %         Height 5' 11" (1.803 m) 5' 11" (1.803 m) 5' 11" (1.803 m) 5' 11" (1.803 m) 5' 11" (1.803 m) 5' 11" (1.803 m)    Weight 97 kg (213 lb 13.5 oz) 96.6 kg (213 lb) 98 kg (216 lb) 98 kg (216 lb) 97.3 kg (214 lb 8.1 oz) 95.7 kg (211 lb) 80 kg (176 lb 5.9 oz)   BMI (Calculated) 29.8 29.7 30.1 30.1 29.9 29.4       Assessment:     No diagnosis found.     Outpatient Encounter Medications as of 8/26/2024   Medication Sig Dispense Refill    albuterol (PROVENTIL/VENTOLIN HFA) 90 mcg/actuation inhaler Inhale 2 puffs into the lungs every 8 (eight) hours. 18 g 3    alendronate (FOSAMAX) 70 MG tablet Take 70 mg by mouth.      amLODIPine (NORVASC) 10 MG tablet Take 1 tablet (10 mg total) by mouth once daily. 30 tablet 6    calcitRIOL (ROCALTROL) 0.5 MCG Cap Take by mouth.      carvediloL (COREG) 3.125 MG tablet Take 1 tablet by mouth 2 (two) times daily with meals.      cholecalciferol, vitamin D3, (VITAMIN D3) 1,000 unit capsule Take 2 capsules (2,000 Units total) by mouth once daily. 60 capsule 12    ergocalciferol (ERGOCALCIFEROL) 50,000 unit Cap TAKE 1 CAPSULE BY MOUTH ONE TIME PER WEEK 12 capsule 3    latanoprost 0.005 % ophthalmic solution       omeprazole (PRILOSEC) 40 MG capsule Take 1 capsule by mouth once daily.      oxybutynin (DITROPAN) 5 MG Tab Take 1 tablet by mouth once daily.      pravastatin (PRAVACHOL) 80 MG tablet TAKE 1 TABLET BY MOUTH EVERY EVENING 90 tablet 1    valsartan (DIOVAN) 320 MG tablet TAKE 1 TABLET BY MOUTH EVERY DAY 90 tablet 3    vit B complx C/folic acid/zinc (DIALYVITE 800 WITH ZINC 15 ORAL) Take by mouth.      [DISCONTINUED] pravastatin (PRAVACHOL) 80 MG tablet Take 1 tablet (80 mg total) by " mouth every evening. 90 tablet 3     No facility-administered encounter medications on file as of 8/26/2024.     No orders of the defined types were placed in this encounter.    Plan:     Bronchiectasis: see CT 2/24  Minimal bronchiectasis without previous imaging to determine chronicity. Minimal symptoms. No further work up necessary at this time.     Mild intermittent asthma without complication  Mild symptoms of cough with no shortness of breath per patient. Only using albuterol 2-3x/week. Symptoms worsen during allergy season with post nasal drip. Would start saline sinus rinses when allergy symptoms present and consider adding flonase or azelastine sprays. If symptoms become more chronic or worsen will consider ICS/LABA.     Follow up in 6 months.     Eleazar Vidales MD  [facility]

## 2024-08-27 ENCOUNTER — TELEPHONE (OUTPATIENT)
Dept: INTERNAL MEDICINE | Facility: CLINIC | Age: 74
End: 2024-08-27
Payer: MEDICARE

## 2024-08-27 DIAGNOSIS — D72.19 OTHER EOSINOPHILIA: Primary | ICD-10-CM

## 2024-08-27 PROBLEM — B35.1 ONYCHOMYCOSIS OF TOENAIL: Status: ACTIVE | Noted: 2024-07-24

## 2024-08-27 NOTE — TELEPHONE ENCOUNTER
She continues to have eosinophils in her complete blood count, as we had discussed a few months ago.  I know she is under the care of pulmonary but the treatment that the pulmonologist is giving her does not seem to have solved the issue.      Next step would be an Allergy consultation.  I have placed a referral, please assist with scheduling if she is willing.    Also, please let her know that I will be relocating to the [facility] in January.  I would like to see her for her annual exam around February or March of next year.  If she needs to find another physician here, please let us know and we can help with that transition thanks

## 2024-08-27 NOTE — TELEPHONE ENCOUNTER
Attempted to call pt back to discuss an referral to allergy but no response at this time. Left VM to call office when available.

## 2024-08-27 NOTE — ASSESSMENT & PLAN NOTE
Mild symptoms of cough with no shortness of breath per patient. Only using albuterol 2-3x/week. Symptoms worsen during allergy season with post nasal drip. Would start saline sinus rinses when allergy symptoms present and consider adding flonase or azelastine sprays. If symptoms become more chronic or worsen will consider ICS/LABA.

## 2024-09-26 ENCOUNTER — OFFICE VISIT (OUTPATIENT)
Dept: ALLERGY | Facility: CLINIC | Age: 74
End: 2024-09-26
Payer: MEDICARE

## 2024-09-26 ENCOUNTER — LAB VISIT (OUTPATIENT)
Dept: LAB | Facility: HOSPITAL | Age: 74
End: 2024-09-26
Attending: STUDENT IN AN ORGANIZED HEALTH CARE EDUCATION/TRAINING PROGRAM
Payer: MEDICARE

## 2024-09-26 VITALS — BODY MASS INDEX: 29.38 KG/M2 | WEIGHT: 209.88 LBS | HEIGHT: 71 IN

## 2024-09-26 DIAGNOSIS — D72.19 OTHER EOSINOPHILIA: ICD-10-CM

## 2024-09-26 DIAGNOSIS — J31.0 CHRONIC RHINITIS: Primary | ICD-10-CM

## 2024-09-26 DIAGNOSIS — J45.909 ASTHMA, UNSPECIFIED ASTHMA SEVERITY, UNSPECIFIED WHETHER COMPLICATED, UNSPECIFIED WHETHER PERSISTENT: ICD-10-CM

## 2024-09-26 DIAGNOSIS — J31.0 CHRONIC RHINITIS: ICD-10-CM

## 2024-09-26 DIAGNOSIS — K21.9 GASTROESOPHAGEAL REFLUX DISEASE, UNSPECIFIED WHETHER ESOPHAGITIS PRESENT: ICD-10-CM

## 2024-09-26 PROCEDURE — 86003 ALLG SPEC IGE CRUDE XTRC EA: CPT | Mod: 59 | Performed by: STUDENT IN AN ORGANIZED HEALTH CARE EDUCATION/TRAINING PROGRAM

## 2024-09-26 PROCEDURE — 36415 COLL VENOUS BLD VENIPUNCTURE: CPT | Performed by: STUDENT IN AN ORGANIZED HEALTH CARE EDUCATION/TRAINING PROGRAM

## 2024-09-26 PROCEDURE — 99999 PR PBB SHADOW E&M-EST. PATIENT-LVL III: CPT | Mod: PBBFAC,,, | Performed by: STUDENT IN AN ORGANIZED HEALTH CARE EDUCATION/TRAINING PROGRAM

## 2024-09-26 PROCEDURE — 99213 OFFICE O/P EST LOW 20 MIN: CPT | Mod: PBBFAC | Performed by: STUDENT IN AN ORGANIZED HEALTH CARE EDUCATION/TRAINING PROGRAM

## 2024-09-26 PROCEDURE — 86003 ALLG SPEC IGE CRUDE XTRC EA: CPT | Performed by: STUDENT IN AN ORGANIZED HEALTH CARE EDUCATION/TRAINING PROGRAM

## 2024-09-26 PROCEDURE — 99204 OFFICE O/P NEW MOD 45 MIN: CPT | Mod: S$PBB,,, | Performed by: STUDENT IN AN ORGANIZED HEALTH CARE EDUCATION/TRAINING PROGRAM

## 2024-09-26 NOTE — PROGRESS NOTES
"ALLERGY & IMMUNOLOGY CLINIC   HISTORY OF PRESENT ILLNESS   Referral from: Dr. Luciana Herman  CC:   Chief Complaint   Patient presents with    Allergies     Itchy watery eyes     Nasal Congestion       HPI: Moriah Cha is a 74 y.o. female  History obtained from patient  Whenever gets overheated started to itch  Doesn't feel allergic or asthmatic but for a long time could hear wheezing/breathing when she tried to call asleep and nose always seemed to feel stuffy, she would blow and there would be nothing there that comes and goes. Right now it's gone.     Can't turn AC up too high as usually on 77-78, or sometimes when eats something and if air is on feels like she will catch a cold and wraps neck with towel and then in AM feels better    Follows with pulm for mild intermittent asthma on albuterol every 2-3 days    Drug Allergies:   Review of patient's allergies indicates:   Allergen Reactions    Nsaids (non-steroidal anti-inflammatory drug) Other (See Comments)     CKD         MEDICAL HISTORY   SurgHx:  Past Surgical History:   Procedure Laterality Date    ADENOIDECTOMY      JOINT REPLACEMENT Left 2019    Hip    TONSILLECTOMY          PHYSICAL EXAM   VS: Ht 5' 11" (1.803 m)   Wt 95.2 kg (209 lb 14.1 oz)   BMI 29.27 kg/m²   GENERAL: NAD, well nourished, well appearing  EYES: no conjunctival injection, no discharge, no infraorbital shiners  EARS: external auditory canals normal B/L  NOSE: NT pink 2+ B/L, no polyps  ORAL: MMM, no ulcers, no thrush, no cobblestoning  LUNGS: CTAB, no w/r/c, no increased WOB  DERM: no rashes     ASSESSMENT & PLAN     Chronic rhinitis: suspect mainly due to GERD, gets intermittent congestion, on BP medication but rhinitis is not chronic  - Amb alg panel ordered  - Azelastine 1-2 squirts each nostril twice a day  - Flonase 1-2 squirts each nostril twice a day  - Nose spray technique reviewed (aim out not towards septum, can breathe out of mouth when spraying, can gently rock head or " "blow nose after a few seconds to help with excess liquid - "if you taste it you waste it" as goal is to get it into nasal mucosa)  - Ketotifen, azelastine, or olopatadine eye drops BID PRN (will try pataday)    GERD  - Can contribute to stubborn post nasal drip via protective reflux/reflex mechanism  - Saline rinse with distilled water daily as needed to flush thick mucus out of sinuses  - Guiafenesin (mucinex) 600mg ER twice a day with large glass of water as needed as mucolytic to thin out secretions    Eosinophilia, mild  - 400-500, no further workup indicated    Asthma/cough (mild bronchiectasis seen on CT x1 with no infection history)  - Albuterol 2-3 times a week with good control, established with pulm    Itch: in heat, transient  - Reviewed mast cells, "runner's itch", can try antihistamines if bothersome    Follow up: PRN pending labs, will need to be called for labs (drawn for rhinitis and asthma/cough), trial of pataday      "

## 2024-09-30 ENCOUNTER — TELEPHONE (OUTPATIENT)
Dept: ALLERGY | Facility: CLINIC | Age: 74
End: 2024-09-30
Payer: MEDICARE

## 2024-09-30 LAB
A ALTERNATA IGE QN: <0.1 KU/L
A FUMIGATUS IGE QN: <0.1 KU/L
BERMUDA GRASS IGE QN: <0.1 KU/L
CAT DANDER IGE QN: <0.1 KU/L
CEDAR IGE QN: <0.1 KU/L
D FARINAE IGE QN: <0.1 KU/L
D PTERONYSS IGE QN: <0.1 KU/L
DEPRECATED CEDAR IGE RAST QL: NORMAL
DEPRECATED TIMOTHY IGE RAST QL: NORMAL
DOG DANDER IGE QN: <0.1 KU/L
ELDER IGE QN: <0.1 KU/L
ENGL PLANTAIN IGE QN: <0.1 KU/L
PECAN/HICK TREE IGE QN: <0.1 KU/L
RAST CLASS: NORMAL
TIMOTHY IGE QN: <0.1 KU/L
WEST RAGWEED IGE QN: <0.1 KU/L
WHITE OAK IGE QN: <0.1 KU/L

## 2024-09-30 NOTE — TELEPHONE ENCOUNTER
----- Message from Suzanne Burciaga MD sent at 9/30/2024  2:11 PM CDT -----  Could you please call the patient to let her know her lab results? She is not on the portal.    Results as follows: Negative allergy testing including to common indoor/outdoor molds, dust mite, pollens (trees, grasses, weeds), cat, dog, and cockroach. Nose sprays and eye drops can still be effective, as we discussed during the visit. Blood and skin testing show the same results 90-95% of the time, therefore most people only do one test. Regardless if you would like to get skin testing, we can see you back during a 1 hour visit (please stop antihistamines for 1 week before).     If you would like to further discuss, feel free to schedule an in-person or telehealth visit at your convenience.

## 2024-11-14 ENCOUNTER — TELEPHONE (OUTPATIENT)
Dept: INTERNAL MEDICINE | Facility: CLINIC | Age: 74
End: 2024-11-14
Payer: MEDICARE

## 2024-11-14 NOTE — TELEPHONE ENCOUNTER
"----- Message from Víctor sent at 11/14/2024  2:55 PM CST -----  Regarding: Patient Callback Request  Contact: Pt [phone]  [email or web address]     Patient is calling for Medical Advice regarding: Patient is requesting a callback to discuss her shot records and she has specific questions regarding her shots. She said she wanted to know if the office had "FLUBLOCK"? She also wanted to know more about the new covid vaccine.    How long has patient had these symptoms:    Pharmacy name and phone#:    Patient wants a call back or thru [facility]: Call    Comments:    Please advise patient replies from provider may take up to 48 hours.  "

## 2024-12-15 NOTE — TELEPHONE ENCOUNTER
Care Due:                  Date            Visit Type   Department     Provider  --------------------------------------------------------------------------------                                EP -                              PRIMARY      [facility] INTERNAL  Last Visit: 02-      CARE ([facility])   SERENA Herman                              EP -                              PRIMARY      [facility] FAMILY  Next Visit: 02-      CARE ([facility])   SERENA Herman                                                            Last  Test          Frequency    Reason                     Performed    Due Date  --------------------------------------------------------------------------------    CMP.........  12 months..  ergocalciferol,            01-   01-                             pravastatin, valsartan...    Vitamin D...  12 months..  ergocalciferol...........  01- 01-    [facility] Embedded Care Due Messages. Reference number: [id number].   12/15/2024 3:17:14 PM CST

## 2024-12-16 RX ORDER — PRAVASTATIN SODIUM 80 MG/1
80 TABLET ORAL NIGHTLY
Qty: 90 TABLET | Refills: 0 | Status: SHIPPED | OUTPATIENT
Start: 2024-12-16

## 2024-12-17 NOTE — TELEPHONE ENCOUNTER
Provider Staff:  Action required for this patient    Requires labs      Please see care gap opportunities below in Care Due Message.    Thanks!  [facility]     Appointments      Date Provider   Last Visit   2/6/2024 Luciana Herman MD   Next Visit   2/17/2025 Luciana Herman MD     Refill Decision Note   Moriah Cha  is requesting a refill authorization.    Brief Assessment and Rationale for Refill:  Approve       Medication Therapy Plan:         Comments:     Note composed:8:58 PM 12/16/2024

## 2025-02-05 DIAGNOSIS — Z78.0 MENOPAUSE: ICD-10-CM

## 2025-02-07 ENCOUNTER — TELEPHONE (OUTPATIENT)
Dept: FAMILY MEDICINE | Facility: CLINIC | Age: 75
End: 2025-02-07
Payer: MEDICARE

## 2025-03-15 NOTE — TELEPHONE ENCOUNTER
No care due was identified.  [facility] Embedded Care Due Messages. Reference number: [id number].   3/15/2025 8:46:55 AM CDT

## 2025-03-16 RX ORDER — AMLODIPINE BESYLATE 10 MG/1
10 TABLET ORAL DAILY
Qty: 90 TABLET | Refills: 0 | Status: SHIPPED | OUTPATIENT
Start: 2025-03-16

## 2025-03-16 NOTE — TELEPHONE ENCOUNTER
Refill Decision Note   Moriah Cha  is requesting a refill authorization.  Brief Assessment and Rationale for Refill:  Approve     Medication Therapy Plan:         Comments:     Note composed:12:28 PM 03/16/2025

## 2025-03-27 ENCOUNTER — OFFICE VISIT (OUTPATIENT)
Dept: FAMILY MEDICINE | Facility: CLINIC | Age: 75
End: 2025-03-27
Payer: MEDICARE

## 2025-03-27 VITALS
DIASTOLIC BLOOD PRESSURE: 65 MMHG | BODY MASS INDEX: 28.28 KG/M2 | HEART RATE: 75 BPM | SYSTOLIC BLOOD PRESSURE: 125 MMHG | WEIGHT: 202 LBS | HEIGHT: 71 IN

## 2025-03-27 DIAGNOSIS — R53.83 FATIGUE, UNSPECIFIED TYPE: ICD-10-CM

## 2025-03-27 DIAGNOSIS — B36.9 FUNGAL DERMATITIS: ICD-10-CM

## 2025-03-27 DIAGNOSIS — M25.561 CHRONIC PAIN OF BOTH KNEES: ICD-10-CM

## 2025-03-27 DIAGNOSIS — E53.8 B12 DEFICIENCY: ICD-10-CM

## 2025-03-27 DIAGNOSIS — I70.0 AORTIC ATHEROSCLEROSIS: ICD-10-CM

## 2025-03-27 DIAGNOSIS — I12.9 HYPERTENSIVE NEPHROSCLEROSIS, STAGE 1 THROUGH STAGE 4 OR UNSPECIFIED CHRONIC KIDNEY DISEASE: ICD-10-CM

## 2025-03-27 DIAGNOSIS — D72.19 OTHER EOSINOPHILIA: ICD-10-CM

## 2025-03-27 DIAGNOSIS — R41.3 MEMORY IMPAIRMENT: ICD-10-CM

## 2025-03-27 DIAGNOSIS — N28.1 RENAL CYST: ICD-10-CM

## 2025-03-27 DIAGNOSIS — G47.30 SLEEP APNEA, UNSPECIFIED TYPE: ICD-10-CM

## 2025-03-27 DIAGNOSIS — Z87.891 FORMER SMOKER: ICD-10-CM

## 2025-03-27 DIAGNOSIS — I10 ESSENTIAL HYPERTENSION: ICD-10-CM

## 2025-03-27 DIAGNOSIS — J47.9 BRONCHIECTASIS WITHOUT COMPLICATION: Primary | ICD-10-CM

## 2025-03-27 DIAGNOSIS — E55.9 VITAMIN D DEFICIENCY DISEASE: ICD-10-CM

## 2025-03-27 DIAGNOSIS — N25.81 SECONDARY HYPERPARATHYROIDISM OF RENAL ORIGIN: ICD-10-CM

## 2025-03-27 DIAGNOSIS — G89.29 CHRONIC PAIN OF BOTH KNEES: ICD-10-CM

## 2025-03-27 DIAGNOSIS — N18.32 CHRONIC RENAL FAILURE, STAGE 3B: ICD-10-CM

## 2025-03-27 DIAGNOSIS — E78.2 MIXED HYPERLIPIDEMIA: ICD-10-CM

## 2025-03-27 DIAGNOSIS — M81.0 AGE-RELATED OSTEOPOROSIS WITHOUT CURRENT PATHOLOGICAL FRACTURE: ICD-10-CM

## 2025-03-27 DIAGNOSIS — M54.9 BILATERAL BACK PAIN, UNSPECIFIED BACK LOCATION, UNSPECIFIED CHRONICITY: ICD-10-CM

## 2025-03-27 DIAGNOSIS — M25.562 CHRONIC PAIN OF BOTH KNEES: ICD-10-CM

## 2025-03-27 DIAGNOSIS — J45.20 MILD INTERMITTENT ASTHMA WITHOUT COMPLICATION: ICD-10-CM

## 2025-03-27 DIAGNOSIS — F41.9 ANXIETY: ICD-10-CM

## 2025-03-27 PROCEDURE — G2211 COMPLEX E/M VISIT ADD ON: HCPCS | Mod: S$PBB,,, | Performed by: INTERNAL MEDICINE

## 2025-03-27 PROCEDURE — 99215 OFFICE O/P EST HI 40 MIN: CPT | Mod: S$PBB,,, | Performed by: INTERNAL MEDICINE

## 2025-03-27 PROCEDURE — 99214 OFFICE O/P EST MOD 30 MIN: CPT | Mod: PBBFAC,PO | Performed by: INTERNAL MEDICINE

## 2025-03-27 PROCEDURE — 99999 PR PBB SHADOW E&M-EST. PATIENT-LVL IV: CPT | Mod: PBBFAC,,, | Performed by: INTERNAL MEDICINE

## 2025-03-27 RX ORDER — CLOTRIMAZOLE AND BETAMETHASONE DIPROPIONATE 10; .64 MG/G; MG/G
CREAM TOPICAL 2 TIMES DAILY
Qty: 45 G | Refills: 1 | Status: SHIPPED | OUTPATIENT
Start: 2025-03-27

## 2025-03-27 RX ORDER — ERGOCALCIFEROL 1.25 MG/1
50000 CAPSULE ORAL
Start: 2025-03-27

## 2025-03-27 RX ORDER — ALBUTEROL SULFATE 90 UG/1
2 INHALANT RESPIRATORY (INHALATION) EVERY 8 HOURS
Qty: 18 G | Refills: 3 | Status: SHIPPED | OUTPATIENT
Start: 2025-03-27 | End: 2025-04-26

## 2025-03-27 NOTE — PROGRESS NOTES
Patient ID: Moriah Cha is a 75 y.o. female.    Chief Complaint: Follow-up      Assessment:       1. Bronchiectasis without complication    2. Essential hypertension    3. Mixed hyperlipidemia    4. Aortic atherosclerosis: see CT 2/24    5. Chronic renal failure, stage 3b    6. Hypertensive nephrosclerosis, stage 1 through stage 4 or unspecified chronic kidney disease    7. Age-related osteoporosis without current pathological fracture; see DEXA 2022    8. Secondary hyperparathyroidism of renal origin    9. Vitamin D deficiency disease    10. Former smoker: quit age 40, < 10 pack years    11. Sleep apnea: mild per HST 2021    12. Other eosinophilia    13. Mild intermittent asthma without complication    14. Bilateral back pain, unspecified back location, unspecified chronicity    15. Fatigue, unspecified type    16. B12 deficiency    17. Anxiety    18. Renal cyst: see CT ([facility]) 9/21; also 2/24    19. Memory impairment    20. Fungal dermatitis    21. Chronic pain of both knees       Plan:           1. Bronchiectasis without complication:  Stable per Pulmonary.  Alarm symptoms reviewed, she is having none.  She will resume her inhalers.    2. Essential hypertension: Low salt diet, exercise, 15-20-# weight loss in next 6-12 months' time.  Call if BP > 130/80 on a regular basis.    3. Mixed hyperlipidemia:  Lipids at target range, continue current regimen    4. Aortic atherosclerosis: see CT 2/24:  Lipids at target range, continue current regimen.  Denies syncope, jaw pain or left arm pain.  Exercise recommendations reviewed    5. Chronic renal failure, stage 3b:  Stable, Keep Nephrology follow-up    6. Hypertensive nephrosclerosis, stage 1 through stage 4 or unspecified chronic kidney disease:  Stable, Keep Nephrology follow-up    7. Age-related osteoporosis without current pathological fracture; see DEXA 2022:  On alendronate.  Exercise and fall prevention reviewed.  Schedule DEXA scan.    8. Secondary  hyperparathyroidism of renal origin stable, Keep Nephrology follow-up    9. Vitamin D deficiency disease:  Stable, Keep Nephrology follow-up    10. Former smoker: quit age 40, < 10 pack years    11. Sleep apnea: mild per HST 2021:  Handouts provided hygienic measures reviewed.  Side sleeping recommended.  Alarm symptoms reviewed.    12. Other eosinophilia:  Likely allergic.  Workup so far unrevealing.  No alarm symptoms.  Will continue to monitor.  -     CBC Auto Differential; Future; Expected date: 03/27/2025    13. Mild intermittent asthma without complication  -     albuterol (PROVENTIL/VENTOLIN HFA) 90 mcg/actuation inhaler; Inhale 2 puffs into the lungs every 8 (eight) hours.  Dispense: 18 g; Refill: 3    14. Bilateral back pain, unspecified back location, unspecified chronicity:  She thinks related to sleeping poorly given that she is taking care of her sick dog.  Physical therapy and back stretches recommended.    15. Fatigue, unspecified type  -     TSH; Future; Expected date: 03/27/2025    16. B12 deficiency  -     Vitamin B12; Future; Expected date: 03/27/2025    17. Anxiety  -     Ambulatory referral/consult to Psychology; Future; Expected date: 04/03/2025    18. Renal cyst: see CT ([facility]) 9/21; also 2/24:  Stable without alarm symptoms    19. Memory impairment:  LONG DISCUSSION.  WORK ON SLEEP HYGIENE, EXERCISE, STRESS REDUCTION, WORD GAMES, NUMBER PUZZLES AND OTHER BRAIN STIMULATION.  CONSIDER NEUROPSYCH TESTING, SHE DOES NOT FEEL NECESSARY.  CT SCAN ACCEPTABLE.  SHE IS GOING TO WORK ON SOME THERAPY AND TREATMENT OF ANXIETY, WILL LET ME KNOW SHOULD SHE WANT TO PURSUE FURTHER EVALUATION    20. Fungal dermatitis  -     clotrimazole-betamethasone 1-0.05% (LOTRISONE) cream; Apply topically 2 (two) times daily.  Dispense: 45 g; Refill: 1    21. Chronic pain of both knees:  Avoid bent knee activities  -     Ambulatory Referral/Consult to Physical Therapy; Future; Expected date: 04/03/2025    Other orders  -      ergocalciferol (ERGOCALCIFEROL) 50,000 unit Cap; Take 1 capsule (50,000 Units total) by mouth every 30 days. TAKE 1 CAPSULE BY MOUTH ONE TIME PER WEEK    I will review all studies and determine further tx depending on findings  Patient evaluated for over 50 minutes with this appoinment, including diagnostic testing and treatment.  All questions answered,  chart reviewed and care coordinated.    Visit today manifests increased complexity associated with the care of the multiple chronic and episodic problems I addressed.  I am managing a longitudinal care plan of the patient due to the serious and complex problems listed above.       Subjective:       CHIEF COMPLAINT:  Patient presents today for follow up of multiple concerns including back pain, neck pain, knee pain, and sleep problems, stress and concentration issues.    SLEEP:  She reports poor quality sleep due to caring for sick dog, requiring her to stay awake until approximately 4 AM. She has a history of mild sleep apnea diagnosed in 2021. She reports recent onset of wheezing at night with some coughing but denies mucus production.  She has a history of bronchiectasis.  She was last seen in Pulmonary less than 6 months ago.  CT scan was acceptable.  No further follow-up was recommended unless exacerbations occur.  She does not feel that she is having an exacerbation at this time.  She has had no fever, chills or sweats.    COGNITIVE CONCERNS:  She reports difficulty with general organization and maintaining train of thought when interrupted, stating she cannot recall her original thought after interruption. She expresses concern about her memory, particularly due to family history of Alzheimer's in her mother. She has not discussed these concerns extensively, only with her sister and a close friend. She denies others expressing concern about her memory, noting limited social interactions since moving from [place].  Given family history of dementia, we  discussed a baseline neuropsych testing, which she might consider at a later point.  Head CT done a few years ago in 2023 was acceptable, which she finds reassuring.  She thinks this may have to do with stress and poor sleep since she is up a lot with her dog.  She also does not have a lot of social stimulation right now.  She would like to see a therapist for anxiety and situational stress which I think it is a reasonable 1st approach and then additional neurologic assessment can be obtained should she desire this.    MUSCULOSKELETAL:  She has bilateral knee pain requiring braces due to significant discomfort. She previously consulted with Dr. Etienne who suggested knee injections or surgery. She expresses hesitation about surgery, comparing it to her previous hip surgery. She reports new difficulty navigating stairs, requiring slower pace.  She has not had any falls.  She asks about topical treatments.  She is interested in physical therapy prior to any surgical intervention.    RESPIRATORY:  She has a history of bronchiectasis, currently asymptomatic with some coughing but denies mucus production.    DERMATOLOGIC:  She reports fungal infection in skin folds that worsens during hot weather and very dry skin.    RENAL:  She has chronic kidney disease. Her kidney function was not optimal four months ago but has since improved, though still within chronic kidney disease range.    MEDICATIONS:  She takes Albuterol inhaler PRN, Alendronate weekly for osteoporosis, Amlodipine for blood pressure, Calcitriol monthly, Carvedilol twice daily, Omeprazole PRN for stomach, Pravastatin 80, and Valsartan 320. She discontinued oxybutynin for incontinence due to ineffectiveness.    IMMUNIZATIONS:  She received COVID-19 and influenza vaccinations on November 19th, as well as RSV vaccine. She reports being up to date on all recommended immunizations.      ROS:  General: -fever, -chills, -fatigue, -weight gain, -weight loss,  +difficulty staying asleep, +sleep disturbances  Eyes: -vision changes, -redness, -discharge  ENT: -ear pain, -nasal congestion, -sore throat  Cardiovascular: -chest pain, -palpitations, +lower extremity edema  Respiratory: +cough, -shortness of breath, +wheezing  Gastrointestinal: -abdominal pain, -nausea, -vomiting, -diarrhea, -constipation, -blood in stool  Genitourinary: -dysuria, -hematuria, -frequency  Musculoskeletal: +joint pain, -muscle pain, +pain with movement, +limb pain, +neck pain, +back pain  Skin: -rash, -lesion, +dry skin  Neurological: -headache, -dizziness, -numbness, -tingling, +memory loss, +memory problems  Psychiatric: +anxiety, -depression, +sleep difficulty, +excessive worry          Patient Active Problem List  Diagnosis    Essential hypertension    Mixed hyperlipidemia    Osteoarthritis of both hips    Diverticulosis of large intestine without hemorrhage: seen on CT scan April 2017; also 9/21 ([facility]); also 2/24    Intramural leiomyoma of uterus: see CT scan April 2017, also September 2021    Vitamin D deficiency disease    Mild intermittent asthma without complication    Stress incontinence in female    Secondary hyperparathyroidism of renal origin    Urge incontinence    Age-related osteoporosis without current pathological fracture; see DEXA 2022    Chronic renal failure, stage 3b    Hypertensive nephrosclerosis    Sleep apnea: mild, positional per HST 10/21    Renal cyst: see CT ([facility]) 9/21; also 2/24    Renal stone: see CT ([facility]) 9/21    Gastroesophageal reflux disease without esophagitis    Former smoker: quit age 40, < 10 pack years    Other eosinophilia    Bilateral carpal tunnel syndrome    Bronchiectasis: see CT 2/24    Aortic atherosclerosis: see CT 2/24    Onychomycosis of toenail      Gyn December 2024, outside [facility]  Allergy September 2024  Pulmonary August 2024  Podiatry July 2024, outside [facility]  Nephrology June 2024, outside [facility]    Objective:      Physical  Exam  Vitals and nursing note reviewed.   Constitutional:       Appearance: She is well-developed.   HENT:      Head: Normocephalic and atraumatic.      Right Ear: External ear normal.      Left Ear: External ear normal.      Nose: Nose normal.      Mouth/Throat:      Pharynx: No oropharyngeal exudate.   Eyes:      General: No scleral icterus.     Extraocular Movements: Extraocular movements intact.      Conjunctiva/sclera: Conjunctivae normal.   Neck:      Thyroid: No thyromegaly.      Vascular: No JVD.   Cardiovascular:      Rate and Rhythm: Normal rate and regular rhythm.      Heart sounds: Normal heart sounds. No murmur heard.     No gallop.   Pulmonary:      Effort: Pulmonary effort is normal. No respiratory distress.      Breath sounds: Normal breath sounds. No wheezing.   Abdominal:      General: Bowel sounds are normal. There is no distension.      Palpations: Abdomen is soft. There is no mass.      Tenderness: There is no abdominal tenderness. There is no guarding or rebound.   Musculoskeletal:         General: No tenderness. Normal range of motion.      Cervical back: Normal range of motion and neck supple.   Lymphadenopathy:      Cervical: No cervical adenopathy.   Skin:     General: Skin is warm.      Findings: Rash present. No erythema.      Comments: Slight fungal intertrigo groin   Neurological:      General: No focal deficit present.      Mental Status: She is alert and oriented to person, place, and time.      Cranial Nerves: No cranial nerve deficit.      Coordination: Coordination normal.   Psychiatric:         Behavior: Behavior normal.         Thought Content: Thought content normal.         Judgment: Judgment normal.             Health Maintenance Due   Topic Date Due    DEXA Scan  12/19/2024        This note was generated with the assistance of ambient listening technology. Verbal consent was obtained by the patient and accompanying visitor(s) for the recording of patient appointment to  facilitate this note. I attest to having reviewed and edited the generated note for accuracy, though some syntax or spelling errors may persist. Please contact the author of this note for any clarification.

## 2025-04-09 RX ORDER — AMLODIPINE BESYLATE 10 MG/1
10 TABLET ORAL
Qty: 90 TABLET | Refills: 3 | Status: SHIPPED | OUTPATIENT
Start: 2025-04-09

## 2025-04-09 NOTE — TELEPHONE ENCOUNTER
Refill Routing Note   Medication(s) are not appropriate for processing by [facility] for the following reason(s):        Required labs abnormal    [facility] action(s):  Approve  Defer             Appointments  past 12m or future 3m with PCP    Date Provider   Last Visit   3/27/2025 Luciana Herman MD   Next Visit   Visit date not found Luciana Herman MD   ED visits in past 90 days: 0        Note composed:5:46 PM 04/09/2025

## 2025-04-09 NOTE — TELEPHONE ENCOUNTER
No care due was identified.  Health [facility] Embedded Care Due Messages. Reference number: [id number].   4/09/2025 4:43:09 PM CDT

## 2025-04-10 RX ORDER — VALSARTAN 320 MG/1
320 TABLET ORAL
Qty: 90 TABLET | Refills: 0 | Status: SHIPPED | OUTPATIENT
Start: 2025-04-10

## 2025-04-11 RX ORDER — PRAVASTATIN SODIUM 80 MG/1
80 TABLET ORAL NIGHTLY
Qty: 90 TABLET | Refills: 0 | Status: SHIPPED | OUTPATIENT
Start: 2025-04-11

## 2025-04-11 NOTE — TELEPHONE ENCOUNTER
No care due was identified.  Health [facility] Embedded Care Due Messages. Reference number: [id number].   4/11/2025 1:07:46 PM CDT

## 2025-04-11 NOTE — TELEPHONE ENCOUNTER
Refill Routing Note   Medication(s) are not appropriate for processing by [facility] for the following reason(s):        Required labs outdated    [facility] action(s):  Defer               Appointments  past 12m or future 3m with PCP    Date Provider   Last Visit   3/27/2025 Luciana Herman MD   Next Visit   Visit date not found Luciana Herman MD   ED visits in past 90 days: 0        Note composed:1:17 PM 04/11/2025

## 2025-04-17 NOTE — TELEPHONE ENCOUNTER
"Subjective:      Patient ID: Jeanieradha Saravia is a 54 y.o. female.    Vitals:  height is 5' 4" (1.626 m) and weight is 103.7 kg (228 lb 8.1 oz). Her tympanic temperature is 98.1 °F (36.7 °C). Her blood pressure is 148/70 (abnormal) and her pulse is 95. Her respiration is 18 and oxygen saturation is 95%.     Chief Complaint: Pain    53 yo female patient presents with right shoulder/back pain that worsens with cough or tilting head to right. Had bad cough for a while--quitting smoking. Says doctor wants her to do a lung test to check for COPD. Symptoms started yesterday. But coughing has been chronic. On Meloxicam for her neck.       Pain  This is a new problem. The current episode started yesterday. The problem has been gradually worsening. Associated symptoms include chest pain (right chest), coughing (1 week) and myalgias (right shoulder). Pertinent negatives include no headaches, joint swelling, rash or weakness. She has tried nothing for the symptoms.       Constitution: Negative.   HENT: Negative.     Cardiovascular:  Positive for chest pain (right chest). Negative for palpitations.   Eyes: Negative.    Respiratory:  Positive for cough (1 week). Negative for shortness of breath and wheezing.    Gastrointestinal: Negative.    Genitourinary: Negative.    Musculoskeletal:  Positive for muscle ache (right shoulder). Negative for trauma, joint swelling and abnormal ROM of joint.   Skin:  Negative for rash and wound.   Neurological:  Negative for dizziness, headaches and tingling.        Carpal tunnel history      Objective:     Physical Exam   Constitutional: She is oriented to person, place, and time.   HENT:   Nose: Nose normal.   Mouth/Throat: Oropharynx is clear.   Eyes: Conjunctivae are normal.   Neck: Neck supple.   Cardiovascular: Normal rate, regular rhythm, normal heart sounds and normal pulses.   Pulmonary/Chest: Effort normal.   Abdominal: Normal appearance.   Musculoskeletal:         General: No " ----- Message from Bry White sent at 2/25/2019 11:50 AM CST -----  Contact: Patient [phone]  Patient calling to check the status of the Documents that Dr office was suppose to deliver to patient, checking on the status. Needing to renew the handy Cap tag, has not been received.    Would like a call back to update.    Please call an advise  Thank you     swelling.      Comments: Muscle spasm right posterior upper back and superior right shoulder. Mild reproducible tenderness right upper anterior chest.   ROM normal. Shoulder normal.      Neurological: no focal deficit. She is alert and oriented to person, place, and time.   Skin: Skin is no rash.   Psychiatric: Her behavior is normal. Mood, judgment and thought content normal.   Nursing note and vitals reviewed.      Assessment:     1. Nontraumatic shoulder pain, right    2. Right-sided chest wall pain    3. Muscle strain of chest wall, initial encounter    4. Strain of right shoulder, initial encounter    5. Cough, unspecified type        Plan:       Nontraumatic shoulder pain, right  -     XR SHOULDER COMPLETE 2 OR MORE VIEWS RIGHT; Future; Expected date: 04/17/2025    Right-sided chest wall pain  -     XR CHEST PA AND LATERAL; Future; Expected date: 04/17/2025    Muscle strain of chest wall, initial encounter    Strain of right shoulder, initial encounter    Cough, unspecified type    Other orders  -     methocarbamoL (ROBAXIN) 500 MG Tab; Take 1 tablet (500 mg total) by mouth every 8 (eight) hours as needed (muscle spasm, can cause sleepiness).  Dispense: 15 tablet; Refill: 0        EXAM: XR CHEST PA AND LATERAL     CLINICAL HISTORY:  Chest pain     TECHNIQUE: 2 view chest x-ray     COMPARISON:None     FINDINGS: The heart size is normal in size. Lung fields are clear.        Impression:   No acute findings.     Finalized on: 4/17/2025 6:51 PM By:  Melo Laguerre MD  [facility]# [id number]      2025-04-17 18:54:03.081     [facility]        EXAM:  XR SHOULDER COMPLETE 2 OR MORE VIEWS RIGHT     CLINICAL HISTORY: Right shoulder pain.     TECHNIQUE: Three-view right shoulder series.     FINDINGS: No fracture or dislocation.No significant arthritic change.        Impression:   See above.     Finalized on: 4/17/2025 6:51 PM By:  Melo Laguerre MD      SUMMARY:   See hpi  Xrays with no acute abnormality per radiologist  ?strain from the  coughing or from the pinched nerve of the neck or both.  She can't take nsaids and steroids make her feel bad.  Tylenol and muscle relaxer.  Followup with pcp for recheck. May need further testing/treatment/referral.  Followup here as needed  See below.       Patient Instructions   Thank you for allowing our team to take care of you today.  Treating you for chest wall and shoulder strain most likely from recurrent cough and strain from the neck.  Xrays today with no acute abnormality per radiology.  You are restricted as far as what medications you can take.  Add Tylenol (Acetaminophen) every 6 hours.  Prescribing Robaxin muscle relaxer to use but this can cause sleepiness.  Local warmth like heating pad low to medium heat to the back and shoulder.  Limit any aggravating movements.  Followup with your primary care provider. Other testing/treatment/referral may be needed.  Followup here as needed.

## 2025-04-23 ENCOUNTER — HOSPITAL ENCOUNTER (OUTPATIENT)
Dept: RADIOLOGY | Facility: OTHER | Age: 75
Discharge: HOME | End: 2025-04-23
Attending: INTERNAL MEDICINE
Payer: MEDICARE

## 2025-04-23 DIAGNOSIS — Z78.0 MENOPAUSE: ICD-10-CM

## 2025-04-23 PROCEDURE — 77080 DXA BONE DENSITY AXIAL: CPT | Mod: 26,,, | Performed by: RADIOLOGY

## 2025-04-23 PROCEDURE — 77080 DXA BONE DENSITY AXIAL: CPT | Mod: TC

## 2025-04-24 ENCOUNTER — RESULTS FOLLOW-UP (OUTPATIENT)
Dept: FAMILY MEDICINE | Facility: CLINIC | Age: 75
End: 2025-04-24
Payer: MEDICARE

## 2025-04-24 NOTE — LETTER
April 24, 2025    Moriah Cha  [address] [address]             [facility]  [address]    [facility] [address]  Phone: [phone]  Fax: [phone] Dear Ms. Moriah Cha:    Below are the results from your recent visit:    Resulted Orders   DXA Bone Density Axial Skeleton 1 or more sites    Narrative    EXAMINATION:  DXA BONE DENSITY AXIAL SKELETON 1 OR MORE SITES    CLINICAL HISTORY:  Asymptomatic menopausal state    TECHNIQUE:  DXA scanning was performed over the left hip and lumbar spine.  Review of the images confirms satisfactory positioning and technique.    COMPARISON:  None    FINDINGS:  Comparison is 12/19/2022.    BMD lumbar spine 1.087 g/cm squared.  T-score -0.4, previously -0.6.    BMD distal radius 0.310 g/cm squared.  T-score -3.4, previously -2.4.      Impression    Normal BMD lumbar spine, osteoporosis distal radius.    Consider FDA approved medical therapies in postmenopausal women and men aged 50 years and older, based on the following:    *A hip or vertebral (clinical or morphometric) fracture  *T score less than or equal to -2.5 at the femoral neck or spine after appropriate evaluation to exclude secondary causes.  *Low bone mass -- also known as osteopenia (T score between -1.0 and -2.5 at the femoral neck or spine) and a 10 year probability of hip fracture greater than or equal to 3% or a 10 year probability of major osteoporosis-related fracture greater than or equal to 20% based on the [place]-adapted WHO algorithm.  *Clinicians judgment and/or patient preference may indicate treatment for people with 10 year fracture probabilities is above or below these levels.      Electronically signed by: Rojelio Wise MD  Date:    04/23/2025  Time:    14:30     Your results are within an acceptable range.  Please don't hesitate to call our office if you have any questions or concerns.      Your lab work was likewise acceptable.  I recommend a  follow up in the next six-months.    Sincerely,         Luciana Herman MD

## 2025-07-09 DIAGNOSIS — N18.32 CHRONIC RENAL FAILURE, STAGE 3B: ICD-10-CM

## 2025-07-24 NOTE — TELEPHONE ENCOUNTER
Care Due:                  Date            Visit Type   Department     Provider  --------------------------------------------------------------------------------                                EP -                              PRIMARY      [facility] FAMILY  Last Visit: 03-      CARE ([facility])   MEDICINE       Luciana Herman  Next Visit: None Scheduled  None         None Found                                                            Last  Test          Frequency    Reason                     Performed    Due Date  --------------------------------------------------------------------------------    CMP.........  12 months..  ergocalciferol,            01-   01-                             pravastatin, valsartan...    Lipid Panel.  12 months..  pravastatin..............  05- 04-    Vitamin D...  12 months..  ergocalciferol...........  01- 01-    [employer] Embedded Care Due Messages. Reference number: [id number].   7/24/2025 2:13:07 PM CDT

## 2025-07-25 RX ORDER — PRAVASTATIN SODIUM 80 MG/1
80 TABLET ORAL NIGHTLY
Qty: 90 TABLET | Refills: 0 | Status: SHIPPED | OUTPATIENT
Start: 2025-07-25

## 2025-07-25 RX ORDER — VALSARTAN 320 MG/1
320 TABLET ORAL
Qty: 90 TABLET | Refills: 0 | Status: SHIPPED | OUTPATIENT
Start: 2025-07-25

## 2025-07-25 NOTE — TELEPHONE ENCOUNTER
Refill Routing Note   Medication(s) are not appropriate for processing by [facility] for the following reason(s):        Required labs abnormal  Required labs outdated    [facility] action(s):  Defer               Appointments  past 12m or future 3m with PCP    Date Provider   Last Visit   3/27/2025 Luciana Herman MD   Next Visit   Visit date not found Luciana Herman MD   ED visits in past 90 days: 0        Note composed:12:31 PM 07/25/2025